# Patient Record
Sex: FEMALE | Race: WHITE | NOT HISPANIC OR LATINO | Employment: OTHER | URBAN - METROPOLITAN AREA
[De-identification: names, ages, dates, MRNs, and addresses within clinical notes are randomized per-mention and may not be internally consistent; named-entity substitution may affect disease eponyms.]

---

## 2017-04-26 ENCOUNTER — OFFICE VISIT (OUTPATIENT)
Dept: OPHTHALMOLOGY | Facility: CLINIC | Age: 77
End: 2017-04-26
Attending: OPHTHALMOLOGY
Payer: MEDICARE

## 2017-04-26 DIAGNOSIS — H02.88B MEIBOMIAN GLAND DYSFUNCTION (MGD), BILATERAL, BOTH UPPER AND LOWER LIDS: ICD-10-CM

## 2017-04-26 DIAGNOSIS — H04.123 DRY EYES, BILATERAL: ICD-10-CM

## 2017-04-26 DIAGNOSIS — H01.01B ULCERATIVE BLEPHARITIS OF UPPER AND LOWER EYELIDS OF BOTH EYES: Primary | ICD-10-CM

## 2017-04-26 DIAGNOSIS — H02.88A MEIBOMIAN GLAND DYSFUNCTION (MGD), BILATERAL, BOTH UPPER AND LOWER LIDS: ICD-10-CM

## 2017-04-26 DIAGNOSIS — H01.01A ULCERATIVE BLEPHARITIS OF UPPER AND LOWER EYELIDS OF BOTH EYES: Primary | ICD-10-CM

## 2017-04-26 PROCEDURE — 99213 OFFICE O/P EST LOW 20 MIN: CPT | Mod: ZF

## 2017-04-26 RX ORDER — DOXYCYCLINE HYCLATE 50 MG/1
TABLET, FILM COATED ORAL
COMMUNITY
Start: 2016-04-14 | End: 2017-09-18

## 2017-04-26 RX ORDER — LEVOTHYROXINE SODIUM 25 UG/1
TABLET ORAL
COMMUNITY
Start: 2000-01-01 | End: 2018-10-31

## 2017-04-26 RX ORDER — DOXYCYCLINE 100 MG/1
100 CAPSULE ORAL 2 TIMES DAILY
Qty: 120 CAPSULE | Refills: 0 | Status: SHIPPED | OUTPATIENT
Start: 2017-04-26 | End: 2017-07-17

## 2017-04-26 RX ORDER — ALBUTEROL SULFATE 90 UG/1
AEROSOL, METERED RESPIRATORY (INHALATION)
COMMUNITY
Start: 1990-01-01

## 2017-04-26 RX ORDER — CYCLOSPORINE 0.5 MG/ML
EMULSION OPHTHALMIC
COMMUNITY
Start: 2016-11-01 | End: 2017-09-18

## 2017-04-26 RX ORDER — LOSARTAN POTASSIUM 100 MG/1
TABLET ORAL
COMMUNITY
Start: 2000-01-01 | End: 2022-08-12

## 2017-04-26 RX ORDER — AMLODIPINE BESYLATE 5 MG/1
TABLET ORAL
COMMUNITY
Start: 2000-01-01

## 2017-04-26 ASSESSMENT — REFRACTION_WEARINGRX
OD_CYLINDER: +0.25
OD_AXIS: 168
OD_SPHERE: -1.00
OS_AXIS: 082
OS_CYLINDER: +1.00
OS_SPHERE: -0.75
OD_ADD: +2.50

## 2017-04-26 ASSESSMENT — CONF VISUAL FIELD
OD_NORMAL: 1
OS_NORMAL: 1
METHOD: COUNTING FINGERS

## 2017-04-26 ASSESSMENT — VISUAL ACUITY
METHOD: SNELLEN - LINEAR
OS_CC+: +2
CORRECTION_TYPE: GLASSES
OD_CC: J1+
OS_CC: J1+
OD_CC: 20/20
OS_CC: 20/25

## 2017-04-26 ASSESSMENT — TONOMETRY
IOP_METHOD: ICARE
OD_IOP_MMHG: 18
OS_IOP_MMHG: 17

## 2017-04-26 ASSESSMENT — EXTERNAL EXAM - RIGHT EYE: OD_EXAM: NORMAL

## 2017-04-26 ASSESSMENT — EXTERNAL EXAM - LEFT EYE: OS_EXAM: NORMAL

## 2017-04-26 NOTE — PATIENT INSTRUCTIONS
CONTINUE:  Artificial tears, preservative free, at least 4 x daily  Restasis 2 x daily  Warm compresses 2 x daily  Fish oil caps    INCREASE:  Doxycycline to 100mg 2 x per day for 1 month, then 100mg daily for 2 months    RESTART:  Lotemax drops 2-3 x daily    START:  Tobradex ointment at bedtime

## 2017-04-26 NOTE — MR AVS SNAPSHOT
After Visit Summary   4/26/2017    Maria A Ramirez    MRN: [id number]           Patient Information     Date Of Birth          1940        Visit Information        Provider Department      4/26/2017 8:15 AM [facility]per [facility]jas MD [facility]        Today's Diagnoses     Ulcerative blepharitis of upper and lower eyelids of both eyes    -  1    Meibomian gland dysfunction (MGD), bilateral, both upper and lower lids        Dry eyes, bilateral          Care Instructions    CONTINUE:  Artificial tears, preservative free, at least 4 x daily  Restasis 2 x daily  Warm compresses 2 x daily  Fish oil caps    INCREASE:  Doxycycline to 100mg 2 x per day for 1 month, then 100mg daily for 2 months    RESTART:  Lotemax drops 2-3 x daily    START:  Tobradex ointment at bedtime        Follow-ups after your visit        Follow-up notes from your care team     Return in about 4 weeks (around 5/24/2017) for ocular surface check and punctal plug placement.      Your next 10 appointments already scheduled     May 24, 2017  9:45 AM CDT   RETURN GENERAL with Piper Borjas MD   Eye Clinic ([facility])    [address]  [address]  [address]  [facility] [id number] [phone]              Who to contact     Please call your clinic at [phone] to:    Ask questions about your health    Make or cancel appointments    Discuss your medicines    Learn about your test results    Speak to your doctor   If you have compliments or concerns about an experience at your clinic, or if you wish to file a complaint, please contact [facility] Patient Relations at [phone] or email us at [email or web address]         Additional Information About Your Visit        MyChart Information     [employer] gives you secure access to your electronic health record. If you see a primary care provider, you can also send messages to your care team and make appointments. If you  have questions, please call your primary care clinic.  If you do not have a primary care provider, please call [phone] and they will assist you.      Be[employer] is an electronic gateway that provides easy, online access to your medical records. With [employer], you can request a clinic appointment, read your test results, renew a prescription or communicate with your care team.     To access your existing account, please contact your [facility] or call [phone] for assistance.        Care EveryWhere ID     This is your Care EveryWhere ID. This could be used by other organizations to access your [facility] medical records  [id number]         Blood Pressure from Last 3 Encounters:   No data found for BP    Weight from Last 3 Encounters:   No data found for Wt              Today, you had the following     No orders found for display         Today's Medication Changes          These changes are accurate as of: 4/26/17 10:05 AM.  If you have any questions, ask your nurse or doctor.               Start taking these medicines.        Dose/Directions    tobramycin-dexamethasone ophthalmic ointment   Commonly known as:  TOBRADEX   Used for:  Ulcerative blepharitis of upper and lower eyelids of both eyes, Meibomian gland dysfunction (MGD), bilateral, both upper and lower lids   Started by:  Piper Borjas MD        Dose:  1 Application   Place 1 Application into both eyes At Bedtime   Quantity:  1 Tube   Refills:  3         These medicines have changed or have updated prescriptions.        Dose/Directions    * Doxycycline Hyclate 50 MG Tabs   This may have changed:  Another medication with the same name was added. Make sure you understand how and when to take each.   Changed by:  Piper Borjas MD        50 mg 1 time daily   Refills:  0       * doxycycline 100 MG capsule   Commonly known as:  VIBRAMYCIN   This may have changed:  You were already taking a medication with the same name,  and this prescription was added. Make sure you understand how and when to take each.   Used for:  Ulcerative blepharitis of upper and lower eyelids of both eyes, Meibomian gland dysfunction (MGD), bilateral, both upper and lower lids   Changed by:  Piper Borjas MD        Dose:  100 mg   Take 1 capsule (100 mg) by mouth 2 times daily   Quantity:  120 capsule   Refills:  0       * Notice:  This list has 2 medication(s) that are the same as other medications prescribed for you. Read the directions carefully, and ask your doctor or other care provider to review them with you.         Where to get your medicines      These medications were sent to [id number] - [place], [place] - [address]  [address], [place] [address]    Hours:  test fax successful 4/5/04 kr Phone:  [phone]     doxycycline 100 MG capsule    tobramycin-dexamethasone ophthalmic ointment                Primary Care Provider    No Pcp Confirmed       No address on file        Thank you!     Thank you for choosing EYE CLINIC  for your care. Our goal is always to provide you with excellent care. Hearing back from our patients is one way we can continue to improve our services. Please take a few minutes to complete the written survey that you may receive in the mail after your visit with us. Thank you!             Your Updated Medication List - Protect others around you: Learn how to safely use, store and throw away your medicines at www.disposemymeds.org.          This list is accurate as of: 4/26/17 10:05 AM.  Always use your most recent med list.                   Brand Name Dispense Instructions for use    albuterol 108 (90 BASE) MCG/ACT Inhaler   Generic drug:  albuterol          amLODIPine 5 MG tablet    NORVASC     5 mg 1 time daily       * Doxycycline Hyclate 50 MG Tabs      50 mg 1 time daily       * doxycycline 100 MG capsule    VIBRAMYCIN    120 capsule    Take 1 capsule (100 mg) by mouth 2 times daily        levothyroxine 25 MCG tablet    SYNTHROID/LEVOTHROID     0.025 mg 1 time daily       losartan 100 MG tablet    COZAAR     100 mg 1 time daily       RESTASIS 0.05 % ophthalmic emulsion   Generic drug:  cycloSPORINE      2 times daily OU       tobramycin-dexamethasone ophthalmic ointment    TOBRADEX    1 Tube    Place 1 Application into both eyes At Bedtime       * Notice:  This list has 2 medication(s) that are the same as other medications prescribed for you. Read the directions carefully, and ask your doctor or other care provider to review them with you.

## 2017-04-26 NOTE — NURSING NOTE
Chief Complaints and History of Present Illnesses   Patient presents with     New Patient     Dry eyes     HPI    Affected eye(s):  Both   Symptoms:     Blurred vision   No decreased vision   Foreign body sensation   No tearing   Dryness   Burning   No photophobia   No eye discharge      Frequency:  Constant       Do you have eye pain now?:  No      Comments:  Pt. stated dry eyes over the last 2 years along with intermittent gritty feeling OU. Intermittent blurry vision.  AT's Gentael 3-4 times daily, Restasis 2 times daily. She tried Lotamax and Dexamethasone they made no difference.  Warm compresses daily.  Aleksandar Jim  8:45 AM April 26, 2017

## 2017-04-26 NOTE — PROGRESS NOTES
HPI  Maria A Ramirez is a 76 year old female here for another opinion on management of blepharitis. She has had bilateral eye redness, irritation, and steven feeling in both eyes for the last 2 year or so. The symptoms are worst when she in the car with the air blowing. She is currently using Restasis OU BID, Artificial tears preservative free at least 4 x daily, warm compresses about 2 x daily, fish oil, and doxycycline 50mg daily with minimal improvement. She has been on lotemax drops and ointment as well as dexamethasone ointment recently without much difference.    POH: Cataract surgery both eyes, s/p YAG OU, ERM  PMH: Asthma, thyroid disease, HTN  FH: No FH of AMD or glc  SH: Non-smoker    Assessment & Plan      (H01.011,  H01.012,  H01.015,  H01.014) Ulcerative blepharitis of upper and lower eyelids of both eyes  (primary encounter diagnosis)  (H02.89,  H01.002,  H01.004,  H01.005) Meibomian gland dysfunction (MGD), bilateral, both upper and lower lids  (H04.123) Dry eyes, bilateral  Comment: With persistent symptoms.   Plan: Recommend the following regimen  CONTINUE:  Artificial tears, preservative free, at least 4 x daily  Restasis 2 x daily  Warm compresses 2 x daily  Fish oil caps    INCREASE:  Doxycycline to 100mg 2 x per day for 1 month, then 100mg daily for 2 months    RESTART:  Lotemax drops 2-3 x daily    START:  Tobradex ointment at bedtime    Plan for recheck in 1 month with placement of punctal plugs next visit.      -----------------------------------------------------------------------------------    Patient disposition:   Return in about 4 weeks (around 5/24/2017) for ocular surface check and punctal plug placement. or sooner as needed.    Teaching statement:  Complete documentation of historical and exam elements from today's encounter can be found in the full encounter summary report (not reduplicated in this progress note). I personally obtained the chief complaint(s) and history of present  illness.  I confirmed and edited as necessary the review of systems, past medical/surgical history, family history, social history, and examination findings as documented by others; and I examined the patient myself. I personally reviewed the relevant tests, images, and reports as documented above.     I formulated and edited as necessary the assessment and plan and discussed the findings and management plan with the patient and family.    Piepr Borjas MD  Comprehensive Ophthalmology & Ocular Pathology  Department of Ophthalmology and Visual Neurosciences  [email or web address]  Pager [phone]

## 2017-05-16 ENCOUNTER — TELEPHONE (OUTPATIENT)
Dept: OPHTHALMOLOGY | Facility: CLINIC | Age: 77
End: 2017-05-16

## 2017-05-16 NOTE — TELEPHONE ENCOUNTER
----- Message from Kaitlynn Devine sent at 5/16/2017  7:31 AM CDT -----  Regarding: Eye Pain  Contact: [phone]  Pt of Dr. Borjas is requesting a call back. She stated that her eye pain seems to be getting worse and wonders if she should wait until her scheduled appt on 5/22 to come in or if she should come in sooner. Please call pt back to advise.     Thank you!    KI    Please DO NOT send this message and/or reply back to sender.  Call Center Representatives DO NOT respond to messages.

## 2017-05-16 NOTE — TELEPHONE ENCOUNTER
Last couple days worsening symptoms both eyes  New redness, irritation  Left eye pain last night-- improving  H/o ulcerative blepharits  Pt states restasis and lotemax eye drops hurt to place and stopped use  Pt using warm compresses, frequent preservative free artificial tears, doxycycline and tobradex ointment at night.  Scheduled appointment tomorrow, reviewed may stop restasis, lotemax if unable to tolerate, may use lubricating eye ointment at night if medicated ointment non-tolerable  Pt seemed pleased with plan   Hardy Keller RN 7:43 AM 05/16/17

## 2017-05-17 ENCOUNTER — OFFICE VISIT (OUTPATIENT)
Dept: OPHTHALMOLOGY | Facility: CLINIC | Age: 77
End: 2017-05-17
Attending: OPHTHALMOLOGY
Payer: MEDICARE

## 2017-05-17 DIAGNOSIS — H04.123 DRY EYES, BILATERAL: Primary | ICD-10-CM

## 2017-05-17 DIAGNOSIS — H02.88B MEIBOMIAN GLAND DYSFUNCTION (MGD), BILATERAL, BOTH UPPER AND LOWER LIDS: ICD-10-CM

## 2017-05-17 DIAGNOSIS — H02.88A MEIBOMIAN GLAND DYSFUNCTION (MGD), BILATERAL, BOTH UPPER AND LOWER LIDS: ICD-10-CM

## 2017-05-17 DIAGNOSIS — H01.01B ULCERATIVE BLEPHARITIS OF UPPER AND LOWER EYELIDS OF BOTH EYES: ICD-10-CM

## 2017-05-17 DIAGNOSIS — H01.01A ULCERATIVE BLEPHARITIS OF UPPER AND LOWER EYELIDS OF BOTH EYES: ICD-10-CM

## 2017-05-17 PROCEDURE — 99212 OFFICE O/P EST SF 10 MIN: CPT | Mod: ZF

## 2017-05-17 PROCEDURE — 68761 CLOSE TEAR DUCT OPENING: CPT | Mod: ZF | Performed by: OPHTHALMOLOGY

## 2017-05-17 ASSESSMENT — TONOMETRY
IOP_METHOD: ICARE
OD_IOP_MMHG: 19
OS_IOP_MMHG: 19

## 2017-05-17 ASSESSMENT — REFRACTION_WEARINGRX
OD_AXIS: 168
OD_ADD: +2.50
OD_CYLINDER: +0.25
OS_CYLINDER: +1.00
OS_AXIS: 082
OS_SPHERE: -0.75
OD_SPHERE: -1.00

## 2017-05-17 ASSESSMENT — VISUAL ACUITY
OS_CC+: -1
OD_CC: 20/25
OS_CC: 20/20
CORRECTION_TYPE: GLASSES
METHOD: SNELLEN - LINEAR

## 2017-05-17 ASSESSMENT — CONF VISUAL FIELD
OS_NORMAL: 1
OD_NORMAL: 1

## 2017-05-17 ASSESSMENT — EXTERNAL EXAM - RIGHT EYE: OD_EXAM: NORMAL

## 2017-05-17 ASSESSMENT — EXTERNAL EXAM - LEFT EYE: OS_EXAM: NORMAL

## 2017-05-17 NOTE — MR AVS SNAPSHOT
After Visit Summary   5/17/2017    Maria A Ramirez    MRN: [id number]           Patient Information     Date Of Birth          1940        Visit Information        Provider Department      5/17/2017 8:00 AM [facility]per [facility]Brjas MD [facility]        Today's Diagnoses     Dry eyes, bilateral    -  1    Meibomian gland dysfunction (MGD), bilateral, both upper and lower lids        Ulcerative blepharitis of upper and lower eyelids of both eyes           Follow-ups after your visit        Follow-up notes from your care team     Return in about 2 months (around 7/17/2017) for ocular surface check.      Who to contact     Please call your clinic at [phone] to:    Ask questions about your health    Make or cancel appointments    Discuss your medicines    Learn about your test results    Speak to your doctor   If you have compliments or concerns about an experience at your clinic, or if you wish to file a complaint, please contact [facility] Patient Relations at [phone] or email us at [email or web address]         Additional Information About Your Visit        MyChart Information     [employer] gives you secure access to your electronic health record. If you see a primary care provider, you can also send messages to your care team and make appointments. If you have questions, please call your primary care clinic.  If you do not have a primary care provider, please call [phone] and they will assist you.      The [employer] is an electronic gateway that provides easy, online access to your medical records. With [employer], you can request a clinic appointment, read your test results, renew a prescription or communicate with your care team.     To access your existing account, please contact your [facility] or call [phone] for assistance.        Care EveryWhere ID     This is your Care EveryWhere ID. This could be used by other  organizations to access your [place] medical records  [id number]         Blood Pressure from Last 3 Encounters:   No data found for BP    Weight from Last 3 Encounters:   No data found for Wt              We Performed the Following     Punctal Closure, Plugs          Today's Medication Changes          These changes are accurate as of: 5/17/17 10:12 AM.  If you have any questions, ask your nurse or doctor.               Start taking these medicines.        Dose/Directions    Lifitegrast 5 % Soln   Used for:  Dry eyes, bilateral   Started by:  Piper Borjas MD        Dose:  1 drop   Place 1 drop into both eyes 2 times daily   Quantity:  30 each   Refills:  11            Where to get your medicines      These medications were sent to [id number] - [email or web address], [place] - [address]  [address], [place] [address]    Hours:  test fax successful 4/5/04 kr Phone:  [phone]     Lifitegrast 5 % Soln                Primary Care Provider    No Pcp Confirmed       No address on file        Thank you!     Thank you for choosing EYE CLINIC  for your care. Our goal is always to provide you with excellent care. Hearing back from our patients is one way we can continue to improve our services. Please take a few minutes to complete the written survey that you may receive in the mail after your visit with us. Thank you!             Your Updated Medication List - Protect others around you: Learn how to safely use, store and throw away your medicines at www.disposemymeds.org.          This list is accurate as of: 5/17/17 10:12 AM.  Always use your most recent med list.                   Brand Name Dispense Instructions for use    albuterol 108 (90 BASE) MCG/ACT Inhaler   Generic drug:  albuterol          amLODIPine 5 MG tablet    NORVASC     5 mg 1 time daily       * Doxycycline Hyclate 50 MG Tabs      50 mg 1 time daily       * doxycycline 100 MG capsule    VIBRAMYCIN    120 capsule    Take 1  capsule (100 mg) by mouth 2 times daily       levothyroxine 25 MCG tablet    SYNTHROID/LEVOTHROID     0.025 mg 1 time daily       Lifitegrast 5 % Soln     30 each    Place 1 drop into both eyes 2 times daily       losartan 100 MG tablet    COZAAR     100 mg 1 time daily       RESTASIS 0.05 % ophthalmic emulsion   Generic drug:  cycloSPORINE      2 times daily OU       tobramycin-dexamethasone ophthalmic ointment    TOBRADEX    1 Tube    Place 1 Application into both eyes At Bedtime       * Notice:  This list has 2 medication(s) that are the same as other medications prescribed for you. Read the directions carefully, and ask your doctor or other care provider to review them with you.

## 2017-05-17 NOTE — PROGRESS NOTES
HPI  Maria A Ramirez is a 76 year old female here for followup of mgd and blepharitis. She reports that she stopped her restasis and lotemax a couple days ago because she had a stinging sensation in her eyes when she used them.     POH: Cataract surgery both eyes, s/p YAG OU, ERM  PMH: Asthma, thyroid disease, HTN  FH: No FH of AMD or glc  SH: Non-smoker    Meds  Doxy 100 mg daily BID  Restasis x 6 months, stopped yesterday  Lotemax x 6 months, stopped yesterday  Tobradex qHS x 3 weeks.    Assessment & Plan      (H01.011,  H01.012,  H01.015,  H01.014) Ulcerative blepharitis of upper and lower eyelids of both eyes  (primary encounter diagnosis)  (H02.89,  H01.002,  H01.004,  H01.005) Meibomian gland dysfunction (MGD), bilateral, both upper and lower lids  (H04.123) Dry eyes, bilateral  Comment: With persistent symptoms.   Plan: Recommend the following regimen  STOP:  Restasis  Lotemax    CONTINUE:  Artificial tears, preservative free, at least 4 x daily  Warm compresses 2 x daily  Fish oil caps  Doxycycline to 100mg 2 x per day for 1 month, then 100mg daily for 2 months  Tobradex ointment at bedtime    START:  Xiidra BID OU  Punctal plugs placed both eyes today (0.6 right LL, 0.7 left LL)      -----------------------------------------------------------------------------------    Patient disposition:   Return in about 2 months (around 7/17/2017) for ocular surface check. or sooner as needed.    Bienvenido Spain MD    Teaching statement:  Complete documentation of historical and exam elements from today's encounter can be found in the full encounter summary report (not reduplicated in this progress note). I personally obtained the chief complaint(s) and history of present illness.  I confirmed and edited as necessary the review of systems, past medical/surgical history, family history, social history, and examination findings as documented by others; and I examined the patient myself. I personally reviewed the relevant  tests, images, and reports as documented above.     I formulated and edited as necessary the assessment and plan and discussed the findings and management plan with the patient and family.    Piper Borjas MD  Comprehensive Ophthalmology & Ocular Pathology  Department of Ophthalmology and Visual Neurosciences  [email or web address]  Pager [phone]

## 2017-05-17 NOTE — NURSING NOTE
Chief Complaints and History of Present Illnesses   Patient presents with     Follow Up For     Ulcerative blepharitis of upper and lower eyelids of both eyes       HPI    Affected eye(s):  Both   Symptoms:        Duration:  3 weeks   Frequency:  Constant       Do you have eye pain now?:  No      Comments:  Pt. States that BE have been feeling more irritated and burning.   FB sensation BE.  No change in VA BE.  Naheed Fuentes COT 8:02 AM May 17, 2017

## 2017-05-23 ENCOUNTER — TELEPHONE (OUTPATIENT)
Dept: OPHTHALMOLOGY | Facility: CLINIC | Age: 77
End: 2017-05-23

## 2017-05-25 ENCOUNTER — TELEPHONE (OUTPATIENT)
Dept: OPHTHALMOLOGY | Facility: CLINIC | Age: 77
End: 2017-05-25

## 2017-05-25 NOTE — TELEPHONE ENCOUNTER
Pt states that her pharmacy told her they had sent three faxes to our office for the Xiidra which needs a PA - I informed pt that I see a note from the PA team from two days ago, so we should be hearing back soon (in the next week or so) whether the PA went through for the desired med.  I told pt to call us back if she hadn't heard anything this time next week.    iPper Jones COA 5:11 PM May 25, 2017

## 2017-06-02 NOTE — TELEPHONE ENCOUNTER
Left message with pt stating eye drop was approved and may contact pharmacy for pickup  Hardy Keller RN 2:11 PM 06/02/17

## 2017-06-02 NOTE — TELEPHONE ENCOUNTER
[facility] Prior Authorization Team   Phone: [phone]  Fax: [phone]      PA Initiation    Medication: Lifitegrast (XIIDRA) 5 % SOLN -   Insurance Company: [employer] - Phone [phone] Fax [phone]  Pharmacy Filling the Rx: [id number] - [place] - [address]  Filling Pharmacy Phone: [phone]  Filling Pharmacy Fax:    Start Date: 6/2/2017

## 2017-06-02 NOTE — TELEPHONE ENCOUNTER
Prior Authorization Approval    Authorization Effective Date: 3/4/2017  Authorization Expiration Date: 6/2/2018  Medication: Lifitegrast (XIIDRA) 5 % SOLN - approved  Approved Dose/Quantity:   Reference #: [id number]   Insurance Company: [employer] Pl[employer] Blue - Phone [phone] Fax [phone]  Expected CoPay: $85.70     CoPay Card Available:      Foundation Assistance Needed:    Which Pharmacy is filling the prescription (Not needed for infusion/clinic administered): [id number] - [place] - [address]  Pharmacy Notified: Yes  Patient Notified: Yes

## 2017-07-17 ENCOUNTER — OFFICE VISIT (OUTPATIENT)
Dept: OPHTHALMOLOGY | Facility: CLINIC | Age: 77
End: 2017-07-17
Attending: OPHTHALMOLOGY
Payer: MEDICARE

## 2017-07-17 DIAGNOSIS — H02.883 MEIBOMIAN GLAND DYSFUNCTION (MGD) OF BOTH EYES: Primary | ICD-10-CM

## 2017-07-17 DIAGNOSIS — H04.123 DRY EYES, BILATERAL: ICD-10-CM

## 2017-07-17 DIAGNOSIS — H02.88A MEIBOMIAN GLAND DYSFUNCTION (MGD), BILATERAL, BOTH UPPER AND LOWER LIDS: ICD-10-CM

## 2017-07-17 DIAGNOSIS — H02.88B MEIBOMIAN GLAND DYSFUNCTION (MGD), BILATERAL, BOTH UPPER AND LOWER LIDS: ICD-10-CM

## 2017-07-17 DIAGNOSIS — H01.01B ULCERATIVE BLEPHARITIS OF UPPER AND LOWER EYELIDS OF BOTH EYES: ICD-10-CM

## 2017-07-17 DIAGNOSIS — H01.01A ULCERATIVE BLEPHARITIS OF UPPER AND LOWER EYELIDS OF BOTH EYES: ICD-10-CM

## 2017-07-17 DIAGNOSIS — H02.886 MEIBOMIAN GLAND DYSFUNCTION (MGD) OF BOTH EYES: Primary | ICD-10-CM

## 2017-07-17 PROCEDURE — 68761 CLOSE TEAR DUCT OPENING: CPT | Mod: ZF,E3 | Performed by: OPHTHALMOLOGY

## 2017-07-17 PROCEDURE — 92015 DETERMINE REFRACTIVE STATE: CPT | Mod: ZF

## 2017-07-17 PROCEDURE — 68761 CLOSE TEAR DUCT OPENING: CPT | Mod: ZF,E1 | Performed by: OPHTHALMOLOGY

## 2017-07-17 PROCEDURE — 99213 OFFICE O/P EST LOW 20 MIN: CPT | Mod: ZF

## 2017-07-17 RX ORDER — ERYTHROMYCIN 5 MG/G
1 OINTMENT OPHTHALMIC AT BEDTIME
Qty: 3 TUBE | Refills: 4 | Status: SHIPPED | OUTPATIENT
Start: 2017-07-17 | End: 2018-01-10

## 2017-07-17 RX ORDER — DOXYCYCLINE HYCLATE 50 MG/1
50 CAPSULE ORAL DAILY
Qty: 90 CAPSULE | Refills: 4 | Status: SHIPPED | OUTPATIENT
Start: 2017-07-17 | End: 2018-01-10

## 2017-07-17 ASSESSMENT — VISUAL ACUITY
CORRECTION_TYPE: GLASSES
OS_CC+: -2
OS_CC: 20/20
OD_CC: 20/20 SLOW
METHOD: SNELLEN - LINEAR

## 2017-07-17 ASSESSMENT — TONOMETRY
IOP_METHOD: ICARE
OD_IOP_MMHG: 24
OS_IOP_MMHG: 25

## 2017-07-17 ASSESSMENT — REFRACTION_WEARINGRX
OD_AXIS: 168
OD_SPHERE: -1.00
OS_AXIS: 082
OS_SPHERE: -0.75
OD_ADD: +2.50
OD_CYLINDER: +0.25
OS_CYLINDER: +1.00

## 2017-07-17 ASSESSMENT — CONF VISUAL FIELD
METHOD: COUNTING FINGERS
OD_NORMAL: 1
OS_NORMAL: 1

## 2017-07-17 ASSESSMENT — REFRACTION_MANIFEST
OD_AXIS: 170
OS_CYLINDER: +1.00
OD_CYLINDER: +0.75
OS_AXIS: 080
OD_ADD: +2.75
OS_SPHERE: -0.50
OS_ADD: +2.75
OD_SPHERE: -1.25

## 2017-07-17 ASSESSMENT — EXTERNAL EXAM - LEFT EYE: OS_EXAM: NORMAL

## 2017-07-17 ASSESSMENT — EXTERNAL EXAM - RIGHT EYE: OD_EXAM: NORMAL

## 2017-07-17 NOTE — MR AVS SNAPSHOT
After Visit Summary   7/17/2017    Maria A Ramirez    MRN: [id number]           Patient Information     Date Of Birth          1940        Visit Information        Provider Department      7/17/2017 9:45 AM [facility]jas MD [facility]        Today's Diagnoses     Meibomian gland dysfunction (MGD) of both eyes    -  1    Ulcerative blepharitis of upper and lower eyelids of both eyes        Meibomian gland dysfunction (MGD), bilateral, both upper and lower lids           Follow-ups after your visit        Your next 10 appointments already scheduled     Sep 18, 2017  9:15 AM CDT   RETURN GENERAL with Piper Borjas MD   Eye Clinic ([facility])    [address]  [address] [facility]  [address]  [facility] [id number] [phone]              Who to contact     Please call your clinic at [phone] to:    Ask questions about your health    Make or cancel appointments    Discuss your medicines    Learn about your test results    Speak to your doctor   If you have compliments or concerns about an experience at your clinic, or if you wish to file a complaint, please contact [facility] Patient Relations at [phone] or email us at [email or web address]         Additional Information About Your Visit        MyChart Information     Alerts gives you secure access to your electronic health record. If you see a primary care provider, you can also send messages to your care team and make appointments. If you have questions, please call your primary care clinic.  If you do not have a primary care provider, please call [phone] and they will assist you.      Alerts is an electronic gateway that provides easy, online access to your medical records. With Alerts, you can request a clinic appointment, read your test results, renew a prescription or communicate with your care team.     To access your existing account, please contact your  [facility] or call [phone] for assistance.        Care EveryWhere ID     This is your Care EveryWhere ID. This could be used by other organizations to access your [place] medical records  [id number]         Blood Pressure from Last 3 Encounters:   No data found for BP    Weight from Last 3 Encounters:   No data found for Wt              Today, you had the following     No orders found for display         Today's Medication Changes          These changes are accurate as of: 7/17/17 11:08 AM.  If you have any questions, ask your nurse or doctor.               Start taking these medicines.        Dose/Directions    erythromycin ophthalmic ointment   Commonly known as:  ROMYCIN   Used for:  Meibomian gland dysfunction (MGD) of both eyes, Ulcerative blepharitis of upper and lower eyelids of both eyes   Started by:  Piper Borjas MD        Dose:  1 Application   Place 1 Application into both eyes At Bedtime   Quantity:  3 Tube   Refills:  4         These medicines have changed or have updated prescriptions.        Dose/Directions    * Doxycycline Hyclate 50 MG Tabs   This may have changed:  Another medication with the same name was changed. Make sure you understand how and when to take each.   Changed by:  Piper Borjas MD        50 mg 1 time daily   Refills:  0       * doxycycline 50 MG capsule   Commonly known as:  VIBRAMYCIN   This may have changed:    - medication strength  - how much to take  - when to take this   Used for:  Ulcerative blepharitis of upper and lower eyelids of both eyes, Meibomian gland dysfunction (MGD), bilateral, both upper and lower lids   Changed by:  Piper Borjas MD        Dose:  50 mg   Take 1 capsule (50 mg) by mouth daily   Quantity:  90 capsule   Refills:  4       * Notice:  This list has 2 medication(s) that are the same as other medications prescribed for you. Read the directions carefully, and ask your doctor or other care provider to  review them with you.         Where to get your medicines      These medications were sent to [facility] - [address] AT [address], Count includes the [facility] [address]     Phone:  [phone]     doxycycline 50 MG capsule    erythromycin ophthalmic ointment                Primary Care Provider    No Pcp Confirmed       No address on file        Equal Access to Services     ROXANE RAMIREZ : Hadii aad ku hadasho Soomaali, waaxda luqadaha, qaybta kaalmada adeegyada, waxay idiin hayaan adeeg seamussh laluis simms. So [facility] [phone].    ATENCIÓN: Si dinora kan, tiene a leung disposición servicios gratuitos de asistencia lingüística. [facility] [phone].    We comply with applicable federal civil rights laws and [place] laws. We do not discriminate on the basis of race, color, national origin, age, disability sex, sexual orientation or gender identity.            Thank you!     Thank you for choosing EYE CLINIC  for your care. Our goal is always to provide you with excellent care. Hearing back from our patients is one way we can continue to improve our services. Please take a few minutes to complete the written survey that you may receive in the mail after your visit with us. Thank you!             Your Updated Medication List - Protect others around you: Learn how to safely use, store and throw away your medicines at www.disposemymeds.org.          This list is accurate as of: 7/17/17 11:08 AM.  Always use your most recent med list.                   Brand Name Dispense Instructions for use Diagnosis    albuterol 108 (90 BASE) MCG/ACT Inhaler   Generic drug:  albuterol           amLODIPine 5 MG tablet    NORVASC     5 mg 1 time daily        * Doxycycline Hyclate 50 MG Tabs      50 mg 1 time daily        * doxycycline 50 MG capsule    VIBRAMYCIN    90 capsule    Take 1 capsule (50 mg) by mouth daily    Ulcerative blepharitis of upper and lower  eyelids of both eyes, Meibomian gland dysfunction (MGD), bilateral, both upper and lower lids       erythromycin ophthalmic ointment    ROMYCIN    3 Tube    Place 1 Application into both eyes At Bedtime    Meibomian gland dysfunction (MGD) of both eyes, Ulcerative blepharitis of upper and lower eyelids of both eyes       levothyroxine 25 MCG tablet    SYNTHROID/LEVOTHROID     0.025 mg 1 time daily        Lifitegrast 5 % Soln     30 each    Place 1 drop into both eyes 2 times daily    Dry eyes, bilateral       losartan 100 MG tablet    COZAAR     100 mg 1 time daily        RESTASIS 0.05 % ophthalmic emulsion   Generic drug:  cycloSPORINE      2 times daily OU        tobramycin-dexamethasone ophthalmic ointment    TOBRADEX    1 Tube    Place 1 Application into both eyes At Bedtime    Ulcerative blepharitis of upper and lower eyelids of both eyes, Meibomian gland dysfunction (MGD), bilateral, both upper and lower lids       * Notice:  This list has 2 medication(s) that are the same as other medications prescribed for you. Read the directions carefully, and ask your doctor or other care provider to review them with you.

## 2017-07-17 NOTE — PROGRESS NOTES
HPI  Maria A Ramirez is a 76 year old female here for followup of mgd and blepharitis. She feels her eyes are a little better since the punctal plugs. She stopped the Xiidra since it made her vision blurry for some time after instillation.    POH: Cataract surgery both eyes, s/p YAG OU, ERM  PMH: Asthma, thyroid disease, HTN  FH: No FH of AMD or glc  SH: Non-smoker    Assessment & Plan      (H01.011,  H01.012,  H01.015,  H01.014) Ulcerative blepharitis of upper and lower eyelids of both eyes  (primary encounter diagnosis)  (H02.89,  H01.002,  H01.004,  H01.005) Meibomian gland dysfunction (MGD), bilateral, both upper and lower lids  (H04.123) Dry eyes, bilateral  Comment: With improved but somewhat persistent symptoms. Has not tolerated Xiidra, Restasis, or Lotemax in the past.  Has punctal plugs both lower lids (0.6 right LL, 0.7 left LL)    Plan: Recommend the following regimen  STOP:  Xiidra  Tobradex    CONTINUE:  Artificial tears, preservative free, at least 4 x daily  Warm compresses 2 x daily  Fish oil caps  Doxycycline to 50mg daily    START:  Erythromycin ointment QHS  Punctal plugs placed both eyes today (0.5 right UL, 0.7 left UL)      -----------------------------------------------------------------------------------    Patient disposition:   Return in about 3 months (around 10/17/2017) for ocular surface check. or sooner as needed.    Teaching statement:  Complete documentation of historical and exam elements from today's encounter can be found in the full encounter summary report (not reduplicated in this progress note). I personally obtained the chief complaint(s) and history of present illness.  I confirmed and edited as necessary the review of systems, past medical/surgical history, family history, social history, and examination findings as documented by others; and I examined the patient myself. I personally reviewed the relevant tests, images, and reports as documented above.     I formulated and  edited as necessary the assessment and plan and discussed the findings and management plan with the patient and family.    Piper Borjas MD  Comprehensive Ophthalmology & Ocular Pathology  Department of Ophthalmology and Visual Neurosciences  [email or web address]  Pager [phone]

## 2017-07-17 NOTE — NURSING NOTE
Chief Complaints and History of Present Illnesses   Patient presents with     Follow Up For     Dry eyes     HPI    Affected eye(s):  Both   Symptoms:        Frequency:  Constant       Do you have eye pain now?:  No      Comments:  Feels that the va is better  Eyes always feel sore but has gotten better since plugs  Still has dryness  Little watery  Maggi Flower COT 9:40 AM July 17, 2017

## 2017-09-18 ENCOUNTER — OFFICE VISIT (OUTPATIENT)
Dept: OPHTHALMOLOGY | Facility: CLINIC | Age: 77
End: 2017-09-18
Attending: OPHTHALMOLOGY
Payer: MEDICARE

## 2017-09-18 DIAGNOSIS — H04.123 DRY EYES, BILATERAL: ICD-10-CM

## 2017-09-18 DIAGNOSIS — H01.02A SQUAMOUS BLEPHARITIS OF UPPER AND LOWER EYELIDS OF BOTH EYES: Primary | ICD-10-CM

## 2017-09-18 DIAGNOSIS — H01.02B SQUAMOUS BLEPHARITIS OF UPPER AND LOWER EYELIDS OF BOTH EYES: Primary | ICD-10-CM

## 2017-09-18 PROCEDURE — 99213 OFFICE O/P EST LOW 20 MIN: CPT | Mod: ZF

## 2017-09-18 RX ORDER — FLUOROMETHOLONE 0.1 %
1 SUSPENSION, DROPS(FINAL DOSAGE FORM)(ML) OPHTHALMIC (EYE) 3 TIMES DAILY
Qty: 1 BOTTLE | Refills: 3 | Status: SHIPPED | OUTPATIENT
Start: 2017-09-18 | End: 2018-03-14

## 2017-09-18 ASSESSMENT — TONOMETRY
OD_IOP_MMHG: 18
OS_IOP_MMHG: 16
IOP_METHOD: ICARE

## 2017-09-18 ASSESSMENT — REFRACTION_WEARINGRX
OD_AXIS: 168
OD_ADD: +2.50
OS_AXIS: 082
OD_SPHERE: -1.00
OS_SPHERE: -0.75
OS_CYLINDER: +1.00
OD_CYLINDER: +0.25

## 2017-09-18 ASSESSMENT — VISUAL ACUITY
CORRECTION_TYPE: GLASSES
METHOD: SNELLEN - LINEAR
OD_CC: 20/20-2
OS_CC: 20/20-1

## 2017-09-18 ASSESSMENT — EXTERNAL EXAM - RIGHT EYE: OD_EXAM: NORMAL

## 2017-09-18 ASSESSMENT — EXTERNAL EXAM - LEFT EYE: OS_EXAM: NORMAL

## 2017-09-18 ASSESSMENT — CONF VISUAL FIELD
OD_NORMAL: 1
METHOD: COUNTING FINGERS
OS_NORMAL: 1

## 2017-09-18 NOTE — PROGRESS NOTES
HPI  Maria A Ramirez is a 76 year old female here for followup of mgd and blepharitis. She has noticed worsening watering in both eyes. This doesn't happen all the time, but when it does, it is frustrating because it keeps her from reading. She also continues to have intermittent eye soreness.     POH: Cataract surgery both eyes, s/p YAG OU, ERM  PMH: Asthma, thyroid disease, HTN  FH: No FH of AMD or glc  SH: Non-smoker    Assessment & Plan      (H01.011,  H01.012,  H01.015,  H01.014) Ulcerative blepharitis of upper and lower eyelids of both eyes  (primary encounter diagnosis)  (H02.89,  H01.002,  H01.004,  H01.005) Meibomian gland dysfunction (MGD), bilateral, both upper and lower lids  (H04.123) Dry eyes, bilateral  Comment: With improved but persistent symptoms. Has not tolerated Xiidra, Restasis, or Lotemax in the past.  Has punctal plugs both lower lids (0.6 right LL, 0.7 left LL) and upper lids (0.5 right UL, 0.7 left UL)    Plan:   Remove upper punctal plugs due to epiphora  Recommend the following regimen    CONTINUE:  Artificial tears, preservative free, at least 4 x daily  Warm compresses 2 x daily  Fish oil caps  Doxycycline to 50mg daily  Erythromycin ointment QHS    START:  FML OU 3x daily    -----------------------------------------------------------------------------------    Patient disposition:   Return in about 2 months (around 11/18/2017) for ocular surface check. or sooner as needed.    Teaching statement:  Complete documentation of historical and exam elements from today's encounter can be found in the full encounter summary report (not reduplicated in this progress note). I personally obtained the chief complaint(s) and history of present illness.  I confirmed and edited as necessary the review of systems, past medical/surgical history, family history, social history, and examination findings as documented by others; and I examined the patient myself. I personally reviewed the relevant tests,  images, and reports as documented above.     I formulated and edited as necessary the assessment and plan and discussed the findings and management plan with the patient and family.    Piper Borjas MD  Comprehensive Ophthalmology & Ocular Pathology  Department of Ophthalmology and Visual Neurosciences  [email or web address]  Pager [phone]

## 2017-09-18 NOTE — NURSING NOTE
Chief Complaints and History of Present Illnesses   Patient presents with     Follow Up For     mgd and blepharitis     HPI    Affected eye(s):  Both   Symptoms:     Blurred vision   Difficulty with reading   Tearing   Dryness         Do you have eye pain now?:  No      Comments:  Using emycin roseline qhs. BE tearing and hard to read. BE feeling sore, about the same as last exam.   Cassie DASILVA September 18, 2017 9:14 AM

## 2017-09-18 NOTE — MR AVS SNAPSHOT
After Visit Summary   9/18/2017    Maria A Ramirez    MRN: [id number]           Patient Information     Date Of Birth          1940        Visit Information        Provider Department      9/18/2017 9:15 AM [facility]        Today's Diagnoses     Squamous blepharitis of upper and lower eyelids of both eyes    -  1    Dry eyes, bilateral           Follow-ups after your visit        Follow-up notes from your care team     Return in about 2 months (around 11/18/2017) for ocular surface check.      Who to contact     Please call your clinic at [phone] to:    Ask questions about your health    Make or cancel appointments    Discuss your medicines    Learn about your test results    Speak to your doctor   If you have compliments or concerns about an experience at your clinic, or if you wish to file a complaint, please contact [facility] Patient Relations at [phone] or email us at [email or web address]         Additional Information About Your Visit        MyChart Information     [employer] gives you secure access to your electronic health record. If you see a primary care provider, you can also send messages to your care team and make appointments. If you have questions, please call your primary care clinic.  If you do not have a primary care provider, please call [phone] and they will assist you.      [employer] is an electronic gateway that provides easy, online access to your medical records. With [employer], you can request a clinic appointment, read your test results, renew a prescription or communicate with your care team.     To access your existing account, please contact your [facility] or call [phone] for assistance.        Care EveryWhere ID     This is your Care EveryWhere ID. This could be used by other organizations to access your [place] medical records  [id number]         Blood  Pressure from Last 3 Encounters:   No data found for BP    Weight from Last 3 Encounters:   No data found for Wt              Today, you had the following     No orders found for display         Today's Medication Changes          These changes are accurate as of: 9/18/17  9:54 AM.  If you have any questions, ask your nurse or doctor.               Start taking these medicines.        Dose/Directions    fluorometholone 0.1 % ophthalmic susp   Commonly known as:  FML LIQUIFILM   Used for:  Squamous blepharitis of upper and lower eyelids of both eyes   Started by:  Piper Borjas MD        Dose:  1 drop   Place 1 drop into both eyes 3 times daily   Quantity:  1 Bottle   Refills:  3         Stop taking these medicines if you haven't already. Please contact your care team if you have questions.     Lifitegrast 5 % Soln   Stopped by:  Piper Borjas MD           tobramycin-dexamethasone ophthalmic ointment   Commonly known as:  TOBRADEX   Stopped by:  Piper Borjas MD                Where to get your medicines      These medications were sent to [facility] - [address] AT [address], [facility] [address]     Phone:  [phone]     fluorometholone 0.1 % ophthalmic susp                Primary Care Provider    No Pcp Confirmed       No address on file        Equal Access to Services     ROAXNE RAMIREZ AH: Dalia huertao Soray, waaxda luqadaha, qaybta kaalmada adeegyada, dante simms. So [facility] [phone].    ATENCIÓN: Si habla español, tiene a leung disposición servicios gratuitos de asistencia lingüística. Llame al [phone].    We comply with applicable federal civil rights laws and [place] laws. We do not discriminate on the basis of race, color, national origin, age, disability sex, sexual orientation or gender identity.            Thank you!     Thank you for choosing [facility]YE CLINIC  for  your care. Our goal is always to provide you with excellent care. Hearing back from our patients is one way we can continue to improve our services. Please take a few minutes to complete the written survey that you may receive in the mail after your visit with us. Thank you!             Your Updated Medication List - Protect others around you: Learn how to safely use, store and throw away your medicines at www.disposemymeds.org.          This list is accurate as of: 9/18/17  9:54 AM.  Always use your most recent med list.                   Brand Name Dispense Instructions for use Diagnosis    amLODIPine 5 MG tablet    NORVASC     5 mg 1 time daily        doxycycline 50 MG capsule    VIBRAMYCIN    90 capsule    Take 1 capsule (50 mg) by mouth daily    Ulcerative blepharitis of upper and lower eyelids of both eyes, Meibomian gland dysfunction (MGD), bilateral, both upper and lower lids       erythromycin ophthalmic ointment    ROMYCIN    3 Tube    Place 1 Application into both eyes At Bedtime    Meibomian gland dysfunction (MGD) of both eyes, Ulcerative blepharitis of upper and lower eyelids of both eyes       fluorometholone 0.1 % ophthalmic susp    FML LIQUIFILM    1 Bottle    Place 1 drop into both eyes 3 times daily    Squamous blepharitis of upper and lower eyelids of both eyes       levothyroxine 25 MCG tablet    SYNTHROID/LEVOTHROID     0.025 mg 1 time daily        losartan 100 MG tablet    COZAAR     100 mg 1 time daily        PROAIR  (90 BASE) MCG/ACT Inhaler   Generic drug:  albuterol

## 2017-11-20 ENCOUNTER — OFFICE VISIT (OUTPATIENT)
Dept: OPHTHALMOLOGY | Facility: CLINIC | Age: 77
End: 2017-11-20
Attending: OPHTHALMOLOGY
Payer: MEDICARE

## 2017-11-20 DIAGNOSIS — H04.123 DRY EYES, BILATERAL: ICD-10-CM

## 2017-11-20 DIAGNOSIS — H02.886 MEIBOMIAN GLAND DYSFUNCTION (MGD) OF BOTH EYES: ICD-10-CM

## 2017-11-20 DIAGNOSIS — H01.02A SQUAMOUS BLEPHARITIS OF UPPER AND LOWER EYELIDS OF BOTH EYES: Primary | ICD-10-CM

## 2017-11-20 DIAGNOSIS — H01.02B SQUAMOUS BLEPHARITIS OF UPPER AND LOWER EYELIDS OF BOTH EYES: Primary | ICD-10-CM

## 2017-11-20 DIAGNOSIS — H02.883 MEIBOMIAN GLAND DYSFUNCTION (MGD) OF BOTH EYES: ICD-10-CM

## 2017-11-20 PROCEDURE — 99212 OFFICE O/P EST SF 10 MIN: CPT | Mod: ZF

## 2017-11-20 ASSESSMENT — VISUAL ACUITY
METHOD: SNELLEN - LINEAR
CORRECTION_TYPE: GLASSES
OS_CC+: +2
OS_CC: 20/25
OD_CC: 20/25
OD_CC+: +2

## 2017-11-20 ASSESSMENT — TONOMETRY
OD_IOP_MMHG: 17
OS_IOP_MMHG: 25
OS_IOP_MMHG: 17
IOP_METHOD: APPLANATION
OD_IOP_MMHG: 17
IOP_METHOD: ICARE

## 2017-11-20 ASSESSMENT — REFRACTION_WEARINGRX
OD_CYLINDER: +0.25
OS_AXIS: 082
OD_AXIS: 168
OS_SPHERE: -0.75
OS_CYLINDER: +1.00
OD_ADD: +2.50
OD_SPHERE: -1.00

## 2017-11-20 ASSESSMENT — CONF VISUAL FIELD
OS_NORMAL: 1
OD_NORMAL: 1

## 2017-11-20 ASSESSMENT — EXTERNAL EXAM - LEFT EYE: OS_EXAM: NORMAL

## 2017-11-20 ASSESSMENT — EXTERNAL EXAM - RIGHT EYE: OD_EXAM: NORMAL

## 2017-11-20 NOTE — PROGRESS NOTES
HPI  Maria A Ramirez is a 77 year old female here for followup of mgd and blepharitis. She feels much better than last visit. She states she continues to have symptoms, but they are much more tolerable.    POH: Cataract surgery both eyes, s/p YAG OU, ERM  PMH: Asthma, thyroid disease, HTN  FH: No FH of AMD or glc  SH: Non-smoker    Assessment & Plan      (H01.011,  H01.012,  H01.015,  H01.014) Ulcerative blepharitis of upper and lower eyelids of both eyes  (primary encounter diagnosis)  (H02.89,  H01.002,  H01.004,  H01.005) Meibomian gland dysfunction (MGD), bilateral, both upper and lower lids  (H04.123) Dry eyes, bilateral  Comment: With improved symptoms. Has not tolerated Xiidra, Restasis, or Lotemax in the past.  Has punctal plugs both lower lids (0.6 right LL, 0.7 left LL)    CONTINUE:  Artificial tears, preservative free, at least 4 x daily  Warm compresses 2 x daily  Fish oil caps  Doxycycline to 50mg daily  FML OU 3x daily  Erythromycin ointment QHS PRN    -----------------------------------------------------------------------------------    Patient disposition:   Return in about 3 months (around 2/20/2018) for ocular surface check. or sooner as needed.    Teaching statement:  Complete documentation of historical and exam elements from today's encounter can be found in the full encounter summary report (not reduplicated in this progress note). I personally obtained the chief complaint(s) and history of present illness.  I confirmed and edited as necessary the review of systems, past medical/surgical history, family history, social history, and examination findings as documented by others; and I examined the patient myself. I personally reviewed the relevant tests, images, and reports as documented above.     I formulated and edited as necessary the assessment and plan and discussed the findings and management plan with the patient and family.    Piper Borjas MD  Comprehensive Ophthalmology & Ocular  Pathology  Department of Ophthalmology and Visual Neurosciences  [email or web address]  Pager [phone]

## 2017-11-20 NOTE — MR AVS SNAPSHOT
After Visit Summary   11/20/2017    Maria A Ramirez    MRN: [id number]           Patient Information     Date Of Birth          1940        Visit Information        Provider Department      11/20/2017 9:15 AM Piper Borjas MD [facility]         Follow-ups after your visit        Follow-up notes from your care team     Return in about 3 months (around 2/20/2018) for ocular surface check.      Your next 10 appointments already scheduled     Feb 21, 2018 10:00 AM CST   RETURN GENERAL with Piper Borjas MD   Eye Clinic ([facility])    [address]  [address] [address] [facility]  [address]  [facility] [address]   [phone]              Who to contact     Please call your clinic at [phone] to:    Ask questions about your health    Make or cancel appointments    Discuss your medicines    Learn about your test results    Speak to your doctor   If you have compliments or concerns about an experience at your clinic, or if you wish to file a complaint, please contact [facility] Patient Relations at [phone] or email us at [email or web address]         Additional Information About Your Visit        MyChart Information     [employer] gives you secure access to your electronic health record. If you see a primary care provider, you can also send messages to your care team and make appointments. If you have questions, please call your primary care clinic.  If you do not have a primary care provider, please call [phone] and they will assist you.      "[employer]hart is an electronic gateway that provides easy, online access to your medical records. With [employer], you can request a clinic appointment, read your test results, renew a prescription or communicate with your care team.     To access your existing account, please contact your [facility] or call [phone] for assistance.        Care EveryWhere ID     This is  your Care EveryWhere ID. This could be used by other organizations to access your [place] medical records  [id number]         Blood Pressure from Last 3 Encounters:   No data found for BP    Weight from Last 3 Encounters:   No data found for Wt              Today, you had the following     No orders found for display       Primary Care Provider Office Phone # Fax #    Manuela Bell [phone]       [facility] [address] [place] [address]        Equal Access to Services     AYDEN [facility] : Hadii aad ku hadasho Soomaali, waaxda luqadaha, qaybta kaalmada adeegyada, waxay idiin hayaan adeeg kharash la'aan . So [facility] [phone].    ATENCIÓN: Si habla español, tiene a leung disposición servicios gratuitos de asistencia lingüística. [facility] [phone].    We comply with applicable federal civil rights laws and [place] laws. We do not discriminate on the basis of race, color, national origin, age, disability, sex, sexual orientation, or gender identity.            Thank you!     Thank you for choosing EYE CLINIC  for your care. Our goal is always to provide you with excellent care. Hearing back from our patients is one way we can continue to improve our services. Please take a few minutes to complete the written survey that you may receive in the mail after your visit with us. Thank you!             Your Updated Medication List - Protect others around you: Learn how to safely use, store and throw away your medicines at www.disposemymeds.org.          This list is accurate as of: 11/20/17  9:56 AM.  Always use your most recent med list.                   Brand Name Dispense Instructions for use Diagnosis    amLODIPine 5 MG tablet    NORVASC     5 mg 1 time daily        doxycycline 50 MG capsule    VIBRAMYCIN    90 capsule    Take 1 capsule (50 mg) by mouth daily    Ulcerative blepharitis of upper and lower eyelids of both eyes, Meibomian gland dysfunction (MGD), bilateral, both  upper and lower lids       erythromycin ophthalmic ointment    ROMYCIN    3 Tube    Place 1 Application into both eyes At Bedtime    Meibomian gland dysfunction (MGD) of both eyes, Ulcerative blepharitis of upper and lower eyelids of both eyes       fluorometholone 0.1 % ophthalmic susp    FML LIQUIFILM    1 Bottle    Place 1 drop into both eyes 3 times daily    Squamous blepharitis of upper and lower eyelids of both eyes       levothyroxine 25 MCG tablet    SYNTHROID/LEVOTHROID     0.025 mg 1 time daily        losartan 100 MG tablet    COZAAR     100 mg 1 time daily        PROAIR  (90 BASE) MCG/ACT Inhaler   Generic drug:  albuterol

## 2017-11-20 NOTE — NURSING NOTE
Chief Complaints and History of Present Illnesses   Patient presents with     Follow Up For     s/p Squamous blepharitis of upper and lower eyelids of both eyes...     HPI    Affected eye(s):  Both   Symptoms:     No blurred vision   No decreased vision   No distorted vision   Dryness      Duration:  2 months   Frequency:  Constant       Do you have eye pain now?:  No      Comments:  Pt. stated she feels her dry eye has improved OU along with stable vision distance and near.  Aleksandar Jim  9:37 AM November 20, 2017

## 2018-01-10 DIAGNOSIS — H01.01B ULCERATIVE BLEPHARITIS OF UPPER AND LOWER EYELIDS OF BOTH EYES: ICD-10-CM

## 2018-01-10 DIAGNOSIS — H02.88A MEIBOMIAN GLAND DYSFUNCTION (MGD), BILATERAL, BOTH UPPER AND LOWER LIDS: ICD-10-CM

## 2018-01-10 DIAGNOSIS — H02.88B MEIBOMIAN GLAND DYSFUNCTION (MGD), BILATERAL, BOTH UPPER AND LOWER LIDS: ICD-10-CM

## 2018-01-10 DIAGNOSIS — H02.886 MEIBOMIAN GLAND DYSFUNCTION (MGD) OF BOTH EYES: ICD-10-CM

## 2018-01-10 DIAGNOSIS — H02.883 MEIBOMIAN GLAND DYSFUNCTION (MGD) OF BOTH EYES: ICD-10-CM

## 2018-01-10 DIAGNOSIS — H01.01A ULCERATIVE BLEPHARITIS OF UPPER AND LOWER EYELIDS OF BOTH EYES: ICD-10-CM

## 2018-01-10 RX ORDER — DOXYCYCLINE HYCLATE 50 MG/1
50 CAPSULE ORAL DAILY
Qty: 90 CAPSULE | Refills: 2 | Status: SHIPPED | OUTPATIENT
Start: 2018-01-10 | End: 2018-03-14

## 2018-01-10 RX ORDER — ERYTHROMYCIN 5 MG/G
OINTMENT OPHTHALMIC
Qty: 3 TUBE | Refills: 2 | Status: SHIPPED | OUTPATIENT
Start: 2018-01-10 | End: 2018-03-14

## 2018-01-10 NOTE — TELEPHONE ENCOUNTER
doxyclycline  Last Written Prescription Date:  7/17/17  Last Fill Quantity: 90,   # refills: 4  Erythromycin ointment      Last Written Prescription Date:  7/17/17  Last Fill Quantity: 3 tube,   # refills: 4  Last Office Visit: 11/20/17  Future Office visit:   2/21/18  Last Note:Progress Notes  Encounter Date: 11/20/2017  Piper Borjas MD   Ophthalmology      []Hide copied text  HPI  Maria A Ramirez is a 77 year old female here for followup of mgd and blepharitis. She feels much better than last visit. She states she continues to have symptoms, but they are much more tolerable.     POH: Cataract surgery both eyes, s/p YAG OU, ERM  PMH: Asthma, thyroid disease, HTN  FH: No FH of AMD or glc  SH: Non-smoker     Assessment & Plan   Assessment & Plan        (H01.011,  H01.012,  H01.015,  H01.014) Ulcerative blepharitis of upper and lower eyelids of both eyes  (primary encounter diagnosis)  (H02.89,  H01.002,  H01.004,  H01.005) Meibomian gland dysfunction (MGD), bilateral, both upper and lower lids  (H04.123) Dry eyes, bilateral  Comment: With improved symptoms. Has not tolerated Xiidra, Restasis, or Lotemax in the past.  Has punctal plugs both lower lids (0.6 right LL, 0.7 left LL)     CONTINUE:  Artificial tears, preservative free, at least 4 x daily  Warm compresses 2 x daily  Fish oil caps  Doxycycline to 50mg daily  FML OU 3x daily  Erythromycin ointment QHS PRN     -----------------------------------------------------------------------------------     Patient disposition:   Return in about 3 months (around 2/20/2018) for ocular surface check. or sooner as needed.     Teaching statement:  Complete documentation of historical and exam elements from today's encounter can be found in the full encounter summary report (not reduplicated in this progress note). I personally obtained the chief complaint(s) and history of present illness.  I confirmed and edited as necessary the review of systems, past medical/surgical  history, family history, social history, and examination findings as documented by others; and I examined the patient myself. I personally reviewed the relevant tests, images, and reports as documented above.      I formulated and edited as necessary the assessment and plan and discussed the findings and management plan with the patient and family.     Piper Borjas MD  Comprehensive Ophthalmology & Ocular Pathology  Department of Ophthalmology and Visual Neurosciences  [email or web address]  Pager [phone]         Routed to MD, new prescriptions to new pharmacy needed due to insurance change prior to next appt  Erythromycin directions updated to prn per last chart note

## 2018-03-14 ENCOUNTER — OFFICE VISIT (OUTPATIENT)
Dept: OPHTHALMOLOGY | Facility: CLINIC | Age: 78
End: 2018-03-14
Attending: OPHTHALMOLOGY
Payer: COMMERCIAL

## 2018-03-14 DIAGNOSIS — H02.88B MEIBOMIAN GLAND DYSFUNCTION (MGD), BILATERAL, BOTH UPPER AND LOWER LIDS: ICD-10-CM

## 2018-03-14 DIAGNOSIS — H01.01A ULCERATIVE BLEPHARITIS OF UPPER AND LOWER EYELIDS OF BOTH EYES: ICD-10-CM

## 2018-03-14 DIAGNOSIS — H02.88A MEIBOMIAN GLAND DYSFUNCTION (MGD), BILATERAL, BOTH UPPER AND LOWER LIDS: ICD-10-CM

## 2018-03-14 DIAGNOSIS — H01.01B ULCERATIVE BLEPHARITIS OF UPPER AND LOWER EYELIDS OF BOTH EYES: ICD-10-CM

## 2018-03-14 DIAGNOSIS — H01.02A SQUAMOUS BLEPHARITIS OF UPPER AND LOWER EYELIDS OF BOTH EYES: ICD-10-CM

## 2018-03-14 DIAGNOSIS — H02.886 MEIBOMIAN GLAND DYSFUNCTION (MGD) OF BOTH EYES: ICD-10-CM

## 2018-03-14 DIAGNOSIS — H02.883 MEIBOMIAN GLAND DYSFUNCTION (MGD) OF BOTH EYES: ICD-10-CM

## 2018-03-14 DIAGNOSIS — H01.02B SQUAMOUS BLEPHARITIS OF UPPER AND LOWER EYELIDS OF BOTH EYES: ICD-10-CM

## 2018-03-14 PROCEDURE — G0463 HOSPITAL OUTPT CLINIC VISIT: HCPCS | Mod: ZF

## 2018-03-14 RX ORDER — DOXYCYCLINE HYCLATE 50 MG/1
50 CAPSULE ORAL DAILY
Qty: 90 CAPSULE | Refills: 4 | Status: SHIPPED | OUTPATIENT
Start: 2018-03-14 | End: 2019-02-25

## 2018-03-14 RX ORDER — ERYTHROMYCIN 5 MG/G
OINTMENT OPHTHALMIC
Qty: 3 TUBE | Refills: 4 | Status: SHIPPED | OUTPATIENT
Start: 2018-03-14 | End: 2018-10-31

## 2018-03-14 RX ORDER — FLUOROMETHOLONE 0.1 %
1 SUSPENSION, DROPS(FINAL DOSAGE FORM)(ML) OPHTHALMIC (EYE) 3 TIMES DAILY
Qty: 1 BOTTLE | Refills: 3 | Status: SHIPPED | OUTPATIENT
Start: 2018-03-14 | End: 2018-10-31

## 2018-03-14 ASSESSMENT — REFRACTION_WEARINGRX
OS_AXIS: 082
OD_AXIS: 168
OS_ADD: +2.50
OS_CYLINDER: +1.00
OD_SPHERE: -1.00
OS_SPHERE: -0.75
OD_CYLINDER: +0.25
OD_ADD: +2.50

## 2018-03-14 ASSESSMENT — VISUAL ACUITY
OD_CC: 20/30
OS_CC: 20/20
CORRECTION_TYPE: GLASSES
OD_CC+: -2
METHOD: SNELLEN - LINEAR

## 2018-03-14 ASSESSMENT — TONOMETRY
OS_IOP_MMHG: 32
IOP_METHOD: APPLANATION
OS_IOP_MMHG: 13
OD_IOP_MMHG: 15
OD_IOP_MMHG: 14
IOP_METHOD: TONOPEN

## 2018-03-14 ASSESSMENT — EXTERNAL EXAM - LEFT EYE: OS_EXAM: NORMAL

## 2018-03-14 ASSESSMENT — CONF VISUAL FIELD
OS_NORMAL: 1
OD_NORMAL: 1

## 2018-03-14 ASSESSMENT — EXTERNAL EXAM - RIGHT EYE: OD_EXAM: NORMAL

## 2018-03-14 NOTE — MR AVS SNAPSHOT
After Visit Summary   3/14/2018    Maria A Ramirez    MRN: [id number]           Patient Information     Date Of Birth          1940        Visit Information        Provider Department      3/14/2018 2:30 PM Piper Borjas MD [facility]        Today's Diagnoses     Meibomian gland dysfunction (MGD) of both eyes        Ulcerative blepharitis of upper and lower eyelids of both eyes        Squamous blepharitis of upper and lower eyelids of both eyes        Meibomian gland dysfunction (MGD), bilateral, both upper and lower lids           Follow-ups after your visit        Follow-up notes from your care team     Return in about 3 months (around 6/14/2018) for ocular surface check and dilation.      Your next 10 appointments already scheduled     Jun 18, 2018  9:00 AM CDT   RETURN GENERAL with Piper Borjas MD   Eye Clinic ([facility])    [address]  [facility] [address] [phone]              Who to contact     Please call your clinic at [phone] to:    Ask questions about your health    Make or cancel appointments    Discuss your medicines    Learn about your test results    Speak to your doctor            Additional Information About Your Visit        MyChart Information     [employer] gives you secure access to your electronic health record. If you see a primary care provider, you can also send messages to your care team and make appointments. If you have questions, please call your primary care clinic.  If you do not have a primary care provider, please call [phone] and they will assist you.      [employer] is an electronic gateway that provides easy, online access to your medical records. With [employer], you can request a clinic appointment, read your test results, renew a prescription or communicate with your care team.     To access your existing account, please contact your [facility] or  call [phone] for assistance.        Care EveryWhere ID     This is your Care EveryWhere ID. This could be used by other organizations to access your [place] medical records  [id number]         Blood Pressure from Last 3 Encounters:   No data found for BP    Weight from Last 3 Encounters:   No data found for Wt              Today, you had the following     No orders found for display         Where to get your medicines      These medications were sent to [facility] - [address]  [address] [facility] [address]     Phone:  [phone]     doxycycline 50 MG capsule    erythromycin ophthalmic ointment    fluorometholone 0.1 % ophthalmic susp          Primary Care Provider Office Phone # Fax #    Manuela NOBLE Ray, LORENZO [phone]       [facility] [address] [place] [address]        Equal Access to Services     AYDEN RAMIREZ : Hadii aad ku hadasho Soomaali, waaxda luqadaha, qaybta kaalmada adeegyada, dante carcamo hayfelicitan anthony plascencia . So [facility] [phone].    ATENCIÓN: Si habla español, tiene a leung disposición servicios gratuitos de asistencia lingüística. [facility] al [phone].    We comply with applicable federal civil rights laws and [place] laws. We do not discriminate on the basis of race, color, national origin, age, disability, sex, sexual orientation, or gender identity.            Thank you!     Thank you for choosing EYE CLINIC  for your care. Our goal is always to provide you with excellent care. Hearing back from our patients is one way we can continue to improve our services. Please take a few minutes to complete the written survey that you may receive in the mail after your visit with us. Thank you!             Your Updated Medication List - Protect others around you: Learn how to safely use, store and throw away your medicines at www.disposemymeds.org.          This list is accurate as of 3/14/18  3:43 PM.   Always use your most recent med list.                   Brand Name Dispense Instructions for use Diagnosis    amLODIPine 5 MG tablet    NORVASC     5 mg 1 time daily        doxycycline 50 MG capsule    VIBRAMYCIN    90 capsule    Take 1 capsule (50 mg) by mouth daily    Ulcerative blepharitis of upper and lower eyelids of both eyes, Meibomian gland dysfunction (MGD), bilateral, both upper and lower lids       erythromycin ophthalmic ointment    ROMYCIN    3 Tube    Place 1 Application into both eyes At Bedtime prn    Meibomian gland dysfunction (MGD) of both eyes, Ulcerative blepharitis of upper and lower eyelids of both eyes       fluorometholone 0.1 % ophthalmic susp    FML LIQUIFILM    1 Bottle    Place 1 drop into both eyes 3 times daily    Squamous blepharitis of upper and lower eyelids of both eyes       levothyroxine 25 MCG tablet    SYNTHROID/LEVOTHROID     0.025 mg 1 time daily        losartan 100 MG tablet    COZAAR     100 mg 1 time daily        PROAIR  (90 BASE) MCG/ACT Inhaler   Generic drug:  albuterol

## 2018-03-14 NOTE — PROGRESS NOTES
HPI  Maria A Ramirez is a 77 year old female here for followup of mgd and blepharitis. She has gotten worse again over the last 2 months with increased burning and irritation.     POH: Cataract surgery both eyes, s/p YAG OU, ERM  PMH: Asthma, thyroid disease, HTN  FH: No FH of AMD or glc  SH: Non-smoker    Assessment & Plan      (H01.011,  H01.012,  H01.015,  H01.014) Ulcerative blepharitis of upper and lower eyelids of both eyes  (primary encounter diagnosis)  (H02.89,  H01.002,  H01.004,  H01.005) Meibomian gland dysfunction (MGD), bilateral, both upper and lower lids  (H04.123) Dry eyes, bilateral  Comment: With improved symptoms. Has not tolerated Xiidra, Restasis, or Lotemax in the past.  Has punctal plugs both lower lids (0.6 right LL, 0.7 left LL). Had upper plugs at one point, but there was too much watering and they were removed.    CONTINUE:  Artificial tears, preservative free, at least 4 x daily  Re-start Warm compresses 2 x daily  Fish oil caps  Doxycycline to 50mg daily  Re-start FML OU 3x daily  Erythromycin ointment QHS    -----------------------------------------------------------------------------------    Patient disposition:   Return in about 3 months (around 6/14/2018) for ocular surface check and dilation. or sooner as needed.    Teaching statement:  Complete documentation of historical and exam elements from today's encounter can be found in the full encounter summary report (not reduplicated in this progress note). I personally obtained the chief complaint(s) and history of present illness.  I confirmed and edited as necessary the review of systems, past medical/surgical history, family history, social history, and examination findings as documented by others; and I examined the patient myself. I personally reviewed the relevant tests, images, and reports as documented above.     I formulated and edited as necessary the assessment and plan and discussed the findings and management plan with  the patient and family.    Piper Borjas MD  Comprehensive Ophthalmology & Ocular Pathology  Department of Ophthalmology and Visual Neurosciences  [email or web address]  Pager [phone]

## 2018-03-14 NOTE — NURSING NOTE
Chief Complaints and History of Present Illnesses   Patient presents with     Follow Up For     Ulcerative Blepharitis      HPI    Affected eye(s):  Both   Symptoms:        Unknown duration       Do you have eye pain now?:  Yes   Location:  OU   Other:  between a 8 and changes with days and climate.    Pain Frequency:  Daily   Pain Characteristics:  Burning      Comments:  Pt complains of some tender and burning eyes which come and go often. Notes that she ran out of FML 1 month ago, otherwise using other eye meds with no problems. Vision is good and stable. DARRELL COATES, COA 2:52 PM 03/14/2018

## 2018-06-25 ENCOUNTER — OFFICE VISIT (OUTPATIENT)
Dept: OPHTHALMOLOGY | Facility: CLINIC | Age: 78
End: 2018-06-25
Attending: OPHTHALMOLOGY
Payer: COMMERCIAL

## 2018-06-25 DIAGNOSIS — H01.01A ULCERATIVE BLEPHARITIS OF UPPER AND LOWER EYELIDS OF BOTH EYES: ICD-10-CM

## 2018-06-25 DIAGNOSIS — H02.886 MEIBOMIAN GLAND DYSFUNCTION (MGD) OF BOTH EYES: ICD-10-CM

## 2018-06-25 DIAGNOSIS — H01.01B ULCERATIVE BLEPHARITIS OF UPPER AND LOWER EYELIDS OF BOTH EYES: ICD-10-CM

## 2018-06-25 DIAGNOSIS — H04.123 DRY EYES, BILATERAL: Primary | ICD-10-CM

## 2018-06-25 DIAGNOSIS — H02.883 MEIBOMIAN GLAND DYSFUNCTION (MGD) OF BOTH EYES: ICD-10-CM

## 2018-06-25 DIAGNOSIS — H43.813 PVD (POSTERIOR VITREOUS DETACHMENT), BILATERAL: ICD-10-CM

## 2018-06-25 PROCEDURE — G0463 HOSPITAL OUTPT CLINIC VISIT: HCPCS | Mod: ZF

## 2018-06-25 ASSESSMENT — REFRACTION_WEARINGRX
OD_ADD: +2.50
OD_SPHERE: -1.00
OS_SPHERE: -0.75
OS_ADD: +2.50
OS_AXIS: 082
OD_AXIS: 168
OS_CYLINDER: +1.00
OD_CYLINDER: +0.25

## 2018-06-25 ASSESSMENT — VISUAL ACUITY
OS_CC: 20/20
OD_CC+: -2
OS_CC+: -2
METHOD: SNELLEN - LINEAR
OD_CC: 20/30

## 2018-06-25 ASSESSMENT — TONOMETRY
IOP_METHOD: TONOPEN
OD_IOP_MMHG: 18
OS_IOP_MMHG: 19

## 2018-06-25 ASSESSMENT — CONF VISUAL FIELD
OD_NORMAL: 1
OS_NORMAL: 1
METHOD: COUNTING FINGERS

## 2018-06-25 ASSESSMENT — EXTERNAL EXAM - RIGHT EYE: OD_EXAM: NORMAL

## 2018-06-25 ASSESSMENT — EXTERNAL EXAM - LEFT EYE: OS_EXAM: NORMAL

## 2018-06-25 NOTE — MR AVS SNAPSHOT
After Visit Summary   6/25/2018    Maria A Ramirez    MRN: [id number]           Patient Information     Date Of Birth          1940        Visit Information        Provider Department      6/25/2018 8:45 AM Piper Borjas MD [facility]        Today's Diagnoses     Dry eyes, bilateral    -  1    Meibomian gland dysfunction (MGD) of both eyes        Ulcerative blepharitis of upper and lower eyelids of both eyes        PVD (posterior vitreous detachment), bilateral           Follow-ups after your visit        Follow-up notes from your care team     Return in about 4 months (around 10/25/2018) for f/u dry eye with IOP check, ocular surface check.      Your next 10 appointments already scheduled     Oct 22, 2018  8:45 AM CDT   RETURN GENERAL with Piper Borjas MD   Eye Clinic ([facility])    [address] [id number] [phone]              Who to contact     Please call your clinic at [phone] to:    Ask questions about your health    Make or cancel appointments    Discuss your medicines    Learn about your test results    Speak to your doctor            Additional Information About Your Visit        MyChart Information     [employer] gives you secure access to your electronic health record. If you see a primary care provider, you can also send messages to your care team and make appointments. If you have questions, please call your primary care clinic.  If you do not have a primary care provider, please call [phone] and they will assist you.      [employer] is an electronic gateway that provides easy, online access to your medical records. With [employer], you can request a clinic appointment, read your test results, renew a prescription or communicate with your care team.     To access your existing account, please contact your [facility] or call [phone] for assistance.        Care  EveryWhere ID     This is your Care EveryWhere ID. This could be used by other organizations to access your [place] medical records  [id number]         Blood Pressure from Last 3 Encounters:   No data found for BP    Weight from Last 3 Encounters:   No data found for Wt              Today, you had the following     No orders found for display       Primary Care Provider Office Phone # Fax #    Manuela Bell [phone]       [facility] [address] [place] [address]        Equal Access to Services     AYDEN [facility] : Hadii aad ku hadasho Soomaali, waaxda luqadaha, qaybta kaalmada adeegyada, waxay idiin hayaan adeeg kharash laluis . So [facility] [phone].    ATENCIÓN: Si habla español, tiene a leung disposición servicios gratuitos de asistencia lingüística. [facility] [phone].    We comply with applicable federal civil rights laws and [place] laws. We do not discriminate on the basis of race, color, national origin, age, disability, sex, sexual orientation, or gender identity.            Thank you!     Thank you for choosing EYE CLINIC  for your care. Our goal is always to provide you with excellent care. Hearing back from our patients is one way we can continue to improve our services. Please take a few minutes to complete the written survey that you may receive in the mail after your visit with us. Thank you!             Your Updated Medication List - Protect others around you: Learn how to safely use, store and throw away your medicines at www.disposemymeds.org.          This list is accurate as of 6/25/18 10:17 AM.  Always use your most recent med list.                   Brand Name Dispense Instructions for use Diagnosis    amLODIPine 5 MG tablet    NORVASC     5 mg 1 time daily        BREO ELLIPTA 100-25 MCG/INH oral inhaler   Generic drug:  fluticasone-vilanterol           doxycycline 50 MG capsule    VIBRAMYCIN    90 capsule    Take 1 capsule (50 mg) by mouth daily     Ulcerative blepharitis of upper and lower eyelids of both eyes, Meibomian gland dysfunction (MGD), bilateral, both upper and lower lids       erythromycin ophthalmic ointment    ROMYCIN    3 Tube    Place 1 Application into both eyes At Bedtime prn    Meibomian gland dysfunction (MGD) of both eyes, Ulcerative blepharitis of upper and lower eyelids of both eyes       fluorometholone 0.1 % ophthalmic susp    FML LIQUIFILM    1 Bottle    Place 1 drop into both eyes 3 times daily    Squamous blepharitis of upper and lower eyelids of both eyes       levothyroxine 25 MCG tablet    SYNTHROID/LEVOTHROID     0.025 mg 1 time daily        losartan 100 MG tablet    COZAAR     100 mg 1 time daily        PROAIR  (90 Base) MCG/ACT Inhaler   Generic drug:  albuterol

## 2018-06-25 NOTE — PROGRESS NOTES
HPI  Maria A Ramirez is a 77 year old female here for followup of mgd and blepharitis. She reports that both eyes continue to feel dry but may be mildly improved since last visit 3mo ago.     Current drops  erythro ointment at bedtime both eyes  FML 3x/day both eyes  Fish oil caps 1 capsule 1x/day  Doxycycline 50mg po daily  Warm compresses 3x/week    POH: Cataract surgery both eyes, s/p YAG OU, ERM  PMH: Asthma, thyroid disease, HTN  FH: No FH of AMD or glc  SH: Non-smoker    Assessment & Plan      (H04.123) Dry eyes, bilateral  (primary encounter diagnosis)  (H02.89) Meibomian gland dysfunction (MGD) of both eyes  (H01.011,  H01.012,  H01.015,  H01.014) Ulcerative blepharitis of upper and lower eyelids of both eyes  Comment: Symptoms improved since 3 months ago  Plan:  CONTINUE:  Increase frequency of Artificial tears, preservative free, to at least 4 x daily  Increase frequency of Warm compresses to 2 x daily  Fish oil caps  Doxycycline 50mg daily  FML OU 3x daily  Erythromycin ointment at bedtime    (H43.813) PVD (posterior vitreous detachment), bilateral  Comment: Asymptomatic with Mix ring both eyes   Plan: Retinal detachment/retinal tear precautions discussed with patient. Contact clinic immediately for new floaters/flashers or shadows in vision.      Lili Ramirez MD   Ophthalmology PGY-3  -----------------------------------------------------------------------------------    Patient disposition:   Return in about 4 months (around 10/25/2018) for f/u dry eye with IOP check, ocular surface check. or sooner as needed.      Teaching statement:  Complete documentation of historical and exam elements from today's encounter can be found in the full encounter summary report (not reduplicated in this progress note). I personally obtained the chief complaint(s) and history of present illness.  I confirmed and edited as necessary the review of systems, past medical/surgical history, family history, social  history, and examination findings as documented by others; and I examined the patient myself. I personally reviewed the relevant tests, images, and reports as documented above.     I formulated and edited as necessary the assessment and plan and discussed the findings and management plan with the patient and family.    Piper Borjas MD  Comprehensive Ophthalmology & Ocular Pathology  Department of Ophthalmology and Visual Neurosciences  [email or web address]  Pager [phone]

## 2018-06-25 NOTE — NURSING NOTE
Chief Complaints and History of Present Illnesses   Patient presents with     Follow Up For     3 month Ocular surface check with Dilation      HPI    Affected eye(s):  Both   Symptoms:           Do you have eye pain now?:  No      Comments:  Per pt vision has been ok x 3 months. BE still feeling dry since last visit 3 months ago and may be a bit better. Pt still using ointment at night a FML drops 3 times a day, and states they are helping with the dryness.     Janee Burk@ [facility] 8:32 AM June 25, 2018

## 2018-10-31 ENCOUNTER — OFFICE VISIT (OUTPATIENT)
Dept: OPHTHALMOLOGY | Facility: CLINIC | Age: 78
End: 2018-10-31
Attending: OPHTHALMOLOGY
Payer: COMMERCIAL

## 2018-10-31 DIAGNOSIS — H04.123 DRY EYES, BILATERAL: Primary | ICD-10-CM

## 2018-10-31 DIAGNOSIS — H01.02B SQUAMOUS BLEPHARITIS OF UPPER AND LOWER EYELIDS OF BOTH EYES: ICD-10-CM

## 2018-10-31 DIAGNOSIS — H01.01A ULCERATIVE BLEPHARITIS OF UPPER AND LOWER EYELIDS OF BOTH EYES: ICD-10-CM

## 2018-10-31 DIAGNOSIS — H02.883 MEIBOMIAN GLAND DYSFUNCTION (MGD) OF BOTH EYES: ICD-10-CM

## 2018-10-31 DIAGNOSIS — H01.01B ULCERATIVE BLEPHARITIS OF UPPER AND LOWER EYELIDS OF BOTH EYES: ICD-10-CM

## 2018-10-31 DIAGNOSIS — H02.886 MEIBOMIAN GLAND DYSFUNCTION (MGD) OF BOTH EYES: ICD-10-CM

## 2018-10-31 DIAGNOSIS — H01.02A SQUAMOUS BLEPHARITIS OF UPPER AND LOWER EYELIDS OF BOTH EYES: ICD-10-CM

## 2018-10-31 PROCEDURE — G0463 HOSPITAL OUTPT CLINIC VISIT: HCPCS | Mod: ZF

## 2018-10-31 RX ORDER — ERYTHROMYCIN 5 MG/G
OINTMENT OPHTHALMIC
Qty: 3 TUBE | Refills: 4 | Status: SHIPPED | OUTPATIENT
Start: 2018-10-31 | End: 2019-10-29

## 2018-10-31 ASSESSMENT — REFRACTION_WEARINGRX
OD_SPHERE: -1.00
OD_ADD: +2.50
OD_CYLINDER: +0.25
OS_SPHERE: -0.75
OD_AXIS: 168
OS_AXIS: 082
OS_CYLINDER: +1.00
OS_ADD: +2.50

## 2018-10-31 ASSESSMENT — EXTERNAL EXAM - RIGHT EYE: OD_EXAM: NORMAL

## 2018-10-31 ASSESSMENT — VISUAL ACUITY
OS_CC+: -1
METHOD: SNELLEN - LINEAR
OD_CC: 20/25
CORRECTION_TYPE: GLASSES
OS_CC: 20/20

## 2018-10-31 ASSESSMENT — CONF VISUAL FIELD
OD_NORMAL: 1
OS_NORMAL: 1

## 2018-10-31 ASSESSMENT — TONOMETRY
OS_IOP_MMHG: 17
IOP_METHOD: ICARE
OD_IOP_MMHG: 19

## 2018-10-31 ASSESSMENT — EXTERNAL EXAM - LEFT EYE: OS_EXAM: NORMAL

## 2018-10-31 NOTE — PROGRESS NOTES
HPI  Maria A Ramirez is a 78 year old female here for followup of mgd and blepharitis. She reports that both eyes continued to feel dry this past summer, but are better over the past month such that she has scaled back on her regimen. Using PFAT ~BID each eye. Not using FML or erythromycin ointment. Is doing WC ~1X per week. Still taking doxy and fish oil. S/p punctal plugs X 4 lids; uppers since removed d/t overflow. No improvement w/ Restasis. Did not tolerate Xiidra. Reports stable floaters; not seeing currently. No flashes.      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 Symptoms improved, but discussed increasing regimen as we head into the dry season.  Plan:  CONTINUE:  Artificial tears, preservative free, to at least 4 x daily  Resume Warm compresses to 2 x daily  Continue Fish oil caps  Continue Doxycycline 50mg daily  Erythromycin ointment at bedtime, refilled to resume for winter  Try wrap-around tinted glasses as recommended by Aliyah    Hold FML OU 3x daily for now    Patient disposition:   Return in about 4 months (around 2/28/2019) for ocular surface check. or sooner as needed.    Diaz Chaves MD  Ophthalmology Resident, PGY2    Teaching statement:  Complete documentation of historical and exam elements from today's encounter can be found in the full encounter summary report (not reduplicated in this progress note). I personally obtained the chief complaint(s) and history of present illness.  I confirmed and edited as necessary the review of systems, past medical/surgical history, family history, social history, and examination findings as documented by others; and I  examined the patient myself. I personally reviewed the relevant tests, images, and reports as documented above.     I formulated and edited as necessary the assessment and plan and discussed the findings and management plan with the patient and family.    Piper Borjas MD  Comprehensive Ophthalmology & Ocular Pathology  Department of Ophthalmology and Visual Neurosciences  [email or web address]  Pager [phone]

## 2018-10-31 NOTE — NURSING NOTE
Chief Complaints and History of Present Illnesses   Patient presents with     Follow Up For     dry eye  and IOP check     HPI    Affected eye(s):  Both   Symptoms:     No blurred vision   No decreased vision   No flashes      Duration:  4 months      Do you have eye pain now?:  No      Comments:  Follow up for dry eye and IOP check.   The patient notes that her eyes have been comfortable this past month.    BROWN Ashtno 1:56 PM 10/31/2018

## 2018-10-31 NOTE — MR AVS SNAPSHOT
After Visit Summary   10/31/2018    Maria A Ramirez    MRN: [id number]           Patient Information     Date Of Birth          1940        Visit Information        Provider Department      10/31/2018 1:45 PM [facility]        Today's Diagnoses     Dry eyes, bilateral    -  1    Meibomian gland dysfunction (MGD) of both eyes        Squamous blepharitis of upper and lower eyelids of both eyes        Ulcerative blepharitis of upper and lower eyelids of both eyes           Follow-ups after your visit        Follow-up notes from your care team     Return in about 4 months (around 2/28/2019) for ocular surface check.      Who to contact     Please call your clinic at [phone] to:    Ask questions about your health    Make or cancel appointments    Discuss your medicines    Learn about your test results    Speak to your doctor            Additional Information About Your Visit        MyChart Information     [employer] gives you secure access to your electronic health record. If you see a primary care provider, you can also send messages to your care team and make appointments. If you have questions, please call your primary care clinic.  If you do not have a primary care provider, please call [phone] and they will assist you.      [employer] is an electronic gateway that provides easy, online access to your medical records. With [employer], you can request a clinic appointment, read your test results, renew a prescription or communicate with your care team.     To access your existing account, please contact your [facility] or call [phone] for assistance.        Care EveryWhere ID     This is your Care EveryWhere ID. This could be used by other organizations to access your [facility] medical records  [id number]         Blood Pressure from Last 3 Encounters:   No data found for BP    Weight from Last 3 Encounters:   No data found for Wt               Today, you had the following     No orders found for display         Today's Medication Changes          These changes are accurate as of 10/31/18  5:37 PM.  If you have any questions, ask your nurse or doctor.               Stop taking these medicines if you haven't already. Please contact your care team if you have questions.     fluorometholone 0.1 % ophthalmic susp   Commonly known as:  FML LIQUIFILM   Stopped by:  Piper Borjas MD           levothyroxine 25 MCG tablet   Commonly known as:  SYNTHROID/LEVOTHROID   Stopped by:  Piper Borjas MD                Where to get your medicines      These medications were sent to [facility]  [place], [place] - [address]  [address] [address]     Phone:  [phone]     erythromycin ophthalmic ointment                Primary Care Provider Office Phone # Fax Elizabeth BellLORENZO [phone]       [facility] [address] [place] [address]        Equal Access to Services     [facility] CenterJENAE : Hadii aad ku hadasho Soomaali, waaxda luqadaha, qaybta kaalmada adeegyada, waxay idiin hayaan anthony plascencia . So [facility] [phone].    ATENCIÓN: Si habla español, tiene a leung disposición servicios gratuitos de asistencia lingüística. Shukri al [phone].    We comply with applicable federal civil rights laws and [place] laws. We do not discriminate on the basis of race, color, national origin, age, disability, sex, sexual orientation, or gender identity.            Thank you!     Thank you for choosing [facility]  for your care. Our goal is always to provide you with excellent care. Hearing back from our patients is one way we can continue to improve our services. Please take a few minutes to complete the written survey that you may receive in the mail after your visit with us. Thank you!             Your Updated Medication List - Protect others around you: Learn how to safely use,  store and throw away your medicines at www.disposemymeds.org.          This list is accurate as of 10/31/18  5:37 PM.  Always use your most recent med list.                   Brand Name Dispense Instructions for use Diagnosis    amLODIPine 5 MG tablet    NORVASC     5 mg 1 time daily        BREO ELLIPTA 100-25 MCG/INH inhaler   Generic drug:  fluticasone-vilanterol           doxycycline 50 MG capsule    VIBRAMYCIN    90 capsule    Take 1 capsule (50 mg) by mouth daily    Ulcerative blepharitis of upper and lower eyelids of both eyes, Meibomian gland dysfunction (MGD), bilateral, both upper and lower lids       erythromycin ophthalmic ointment    ROMYCIN    3 Tube    Place 1 Application into both eyes At Bedtime prn    Meibomian gland dysfunction (MGD) of both eyes, Ulcerative blepharitis of upper and lower eyelids of both eyes       losartan 100 MG tablet    COZAAR     100 mg 1 time daily        PROAIR  (90 Base) MCG/ACT inhaler   Generic drug:  albuterol

## 2019-02-25 ENCOUNTER — OFFICE VISIT (OUTPATIENT)
Dept: OPHTHALMOLOGY | Facility: CLINIC | Age: 79
End: 2019-02-25
Attending: OPHTHALMOLOGY
Payer: COMMERCIAL

## 2019-02-25 DIAGNOSIS — H01.01B ULCERATIVE BLEPHARITIS OF UPPER AND LOWER EYELIDS OF BOTH EYES: ICD-10-CM

## 2019-02-25 DIAGNOSIS — H02.88B MEIBOMIAN GLAND DYSFUNCTION (MGD), BILATERAL, BOTH UPPER AND LOWER LIDS: ICD-10-CM

## 2019-02-25 DIAGNOSIS — H01.01A ULCERATIVE BLEPHARITIS OF UPPER AND LOWER EYELIDS OF BOTH EYES: ICD-10-CM

## 2019-02-25 DIAGNOSIS — H02.88A MEIBOMIAN GLAND DYSFUNCTION (MGD), BILATERAL, BOTH UPPER AND LOWER LIDS: ICD-10-CM

## 2019-02-25 PROCEDURE — G0463 HOSPITAL OUTPT CLINIC VISIT: HCPCS | Mod: ZF | Performed by: TECHNICIAN/TECHNOLOGIST

## 2019-02-25 RX ORDER — DOXYCYCLINE HYCLATE 50 MG/1
50 CAPSULE ORAL DAILY
Qty: 90 CAPSULE | Refills: 4 | Status: SHIPPED | OUTPATIENT
Start: 2019-02-25 | End: 2019-06-26

## 2019-02-25 ASSESSMENT — VISUAL ACUITY
OD_CC+: -3
OS_CC: 20/25
CORRECTION_TYPE: GLASSES
OD_CC: 20/20
OS_CC+: -2
METHOD: SNELLEN - LINEAR

## 2019-02-25 ASSESSMENT — REFRACTION_WEARINGRX
OD_CYLINDER: +0.25
OS_SPHERE: -0.75
OS_AXIS: 082
OS_CYLINDER: +1.00
OS_ADD: +2.50
OD_ADD: +2.50
OD_SPHERE: -1.00
OD_AXIS: 168

## 2019-02-25 ASSESSMENT — TONOMETRY
OD_IOP_MMHG: 19
OS_IOP_MMHG: 19
IOP_METHOD: ICARE

## 2019-02-25 ASSESSMENT — CONF VISUAL FIELD
METHOD: COUNTING FINGERS
OD_NORMAL: 1
OS_NORMAL: 1

## 2019-02-25 ASSESSMENT — EXTERNAL EXAM - LEFT EYE: OS_EXAM: NORMAL

## 2019-02-25 ASSESSMENT — EXTERNAL EXAM - RIGHT EYE: OD_EXAM: NORMAL

## 2019-02-25 NOTE — PROGRESS NOTES
HPI  Maria A Ramirez is a 78 year old female here for followup of MGD and blepharitis. She reports that overall her eyes have been feeling comfortable, and the dryness is controlled. She continues her eye medication regimen and has been running a humidifier all the time.   Reports stable floaters; not seeing currently. No flashes.      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 Symptoms improved, but discussed increasing regimen as we head into the dry season.  Plan:  CONTINUE:  Artificial tears, preservative free, to at least 4 x daily  Warm compresses to 2 x daily  Continue Fish oil caps  Continue Doxycycline 50mg daily  Erythromycin ointment at bedtime, resume for winter  Try wrap-around tinted glasses as recommended by Aliyah Mendes FML OU 3x daily for now; no help from Restasis; Did not tolerate Xiidra.  S/p punctal plugs X 4 lids; uppers since removed d/t overflow - LL plugs in place    Patient disposition:   Return in about 4 months (around 6/25/2019) for ocular surface check and dilation. or sooner as needed.      Teaching statement:  Complete documentation of historical and exam elements from today's encounter can be found in the full encounter summary report (not reduplicated in this progress note). I personally obtained the chief complaint(s) and history of present illness.  I confirmed and edited as necessary the review of systems, past medical/surgical history, family history, social history, and examination findings as documented by others; and I examined the patient myself. I personally reviewed the relevant tests, images, and reports as documented above.     I  formulated and edited as necessary the assessment and plan and discussed the findings and management plan with the patient and family.    Piper Borjas MD  Comprehensive Ophthalmology & Ocular Pathology  Department of Ophthalmology and Visual Neurosciences  [email or web address]  Pager [phone]

## 2019-02-25 NOTE — NURSING NOTE
Chief Complaint(s) and History of Present Illness(es)     Follow Up     In both eyes (Ocular surface recheck).              Comments     Prednisone for the past 5 days for asthma - today is the last day per patient.   No new changes in vision each eye.   No double vision.     Janee Albert CO 2/25/2019 9:46 AM

## 2019-06-26 ENCOUNTER — OFFICE VISIT (OUTPATIENT)
Dept: OPHTHALMOLOGY | Facility: CLINIC | Age: 79
End: 2019-06-26
Attending: OPHTHALMOLOGY
Payer: COMMERCIAL

## 2019-06-26 DIAGNOSIS — M35.00 HISTORY OF SJOGREN'S DISEASE (H): Primary | ICD-10-CM

## 2019-06-26 DIAGNOSIS — H01.01B ULCERATIVE BLEPHARITIS OF UPPER AND LOWER EYELIDS OF BOTH EYES: ICD-10-CM

## 2019-06-26 DIAGNOSIS — H02.88B MEIBOMIAN GLAND DYSFUNCTION (MGD), BILATERAL, BOTH UPPER AND LOWER LIDS: ICD-10-CM

## 2019-06-26 DIAGNOSIS — H01.01A ULCERATIVE BLEPHARITIS OF UPPER AND LOWER EYELIDS OF BOTH EYES: ICD-10-CM

## 2019-06-26 DIAGNOSIS — H02.88A MEIBOMIAN GLAND DYSFUNCTION (MGD), BILATERAL, BOTH UPPER AND LOWER LIDS: ICD-10-CM

## 2019-06-26 PROCEDURE — G0463 HOSPITAL OUTPT CLINIC VISIT: HCPCS | Mod: ZF

## 2019-06-26 RX ORDER — DOXYCYCLINE HYCLATE 50 MG/1
50 CAPSULE ORAL DAILY
Qty: 90 CAPSULE | Refills: 4 | Status: SHIPPED | OUTPATIENT
Start: 2019-06-26 | End: 2020-09-28

## 2019-06-26 RX ORDER — ONDANSETRON 4 MG/1
TABLET, FILM COATED ORAL
Refills: 0 | COMMUNITY
Start: 2019-05-21 | End: 2019-10-29

## 2019-06-26 RX ORDER — CEVIMELINE HYDROCHLORIDE 30 MG/1
30 CAPSULE ORAL
COMMUNITY
Start: 2019-05-22 | End: 2022-08-12

## 2019-06-26 RX ORDER — DIPHENOXYLATE HYDROCHLORIDE AND ATROPINE SULFATE 2.5; .025 MG/1; MG/1
TABLET ORAL
COMMUNITY
Start: 2006-03-23

## 2019-06-26 RX ORDER — POLYETHYLENE GLYCOL 3350, SODIUM SULFATE, SODIUM CHLORIDE, POTASSIUM CHLORIDE, ASCORBIC ACID, SODIUM ASCORBATE 7.5-2.691G
KIT ORAL
Refills: 0 | COMMUNITY
Start: 2019-05-20 | End: 2019-10-29

## 2019-06-26 RX ORDER — DOXYCYCLINE 50 MG/1
TABLET ORAL
COMMUNITY
Start: 2017-09-19 | End: 2019-10-29

## 2019-06-26 RX ORDER — FLUOROMETHOLONE 0.1 %
1 SUSPENSION, DROPS(FINAL DOSAGE FORM)(ML) OPHTHALMIC (EYE) 3 TIMES DAILY
Qty: 10 ML | Refills: 1 | Status: SHIPPED | OUTPATIENT
Start: 2019-06-26 | End: 2019-09-30

## 2019-06-26 RX ORDER — POLYETHYLENE GLYCOL-3350, SODIUM CHLORIDE, POTASSIUM CHLORIDE AND SODIUM BICARBONATE 420; 11.2; 5.72; 1.48 G/438.4G; G/438.4G; G/438.4G; G/438.4G
POWDER, FOR SOLUTION ORAL
Refills: 0 | COMMUNITY
Start: 2019-05-16 | End: 2019-10-29

## 2019-06-26 RX ORDER — FLUOROMETHOLONE 0.1 %
1 SUSPENSION, DROPS(FINAL DOSAGE FORM)(ML) OPHTHALMIC (EYE)
COMMUNITY
Start: 2018-05-22 | End: 2019-06-26

## 2019-06-26 RX ORDER — HYDROCODONE BITARTRATE AND ACETAMINOPHEN 5; 325 MG/1; MG/1
TABLET ORAL
Refills: 0 | COMMUNITY
Start: 2019-04-08 | End: 2019-10-29

## 2019-06-26 ASSESSMENT — EXTERNAL EXAM - LEFT EYE: OS_EXAM: NORMAL

## 2019-06-26 ASSESSMENT — REFRACTION_WEARINGRX
OD_SPHERE: -1.00
OD_ADD: +2.50
OD_AXIS: 168
OS_ADD: +2.50
OS_AXIS: 082
OS_SPHERE: -0.75
OD_CYLINDER: +0.25
OS_CYLINDER: +1.00

## 2019-06-26 ASSESSMENT — CONF VISUAL FIELD
OS_NORMAL: 1
OD_NORMAL: 1

## 2019-06-26 ASSESSMENT — VISUAL ACUITY
METHOD: SNELLEN - LINEAR
OD_CC: 20/20
OS_CC+: -1
CORRECTION_TYPE: GLASSES
OS_CC: 20/20

## 2019-06-26 ASSESSMENT — TONOMETRY
IOP_METHOD: ICARE
OS_IOP_MMHG: 17
OD_IOP_MMHG: 19

## 2019-06-26 ASSESSMENT — EXTERNAL EXAM - RIGHT EYE: OD_EXAM: NORMAL

## 2019-06-26 NOTE — PROGRESS NOTES
HPI  Maria A Ramirez is a 78 year old female here for follow-up of ocular surface. States she was recently diagnosed with Sjogrens after a lip biopsy. States eye dryness unchanged. Restarted FML 2-3 times per day. Ran out of PO doxycycline and would like a refill. Stopped using Erythromycin ointment at bedtime - did not like oily feeling. Has not been using hot compresses regularly. Using AT's 2-3 times per day.  Reports stable floaters; not seeing currently. No flashes.    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 Lower plugs in place. Symptoms stable at this time    Plan:  CONTINUE:  Artificial tears, preservative free, to at least 4 x daily  Restart warm compresses 2 x daily  Continue Fish oil caps  Continue Doxycycline 50mg daily  FML TID each eye for now  Try wrap-around tinted glasses as recommended by Aliyah    Previously with no benefit from Restasis; Did not tolerate Xiidra (due to blurring for 20 minutes after administration)  S/p punctal plugs X 4 lids; uppers since removed d/t overflow - LL plugs in place    Discussed scleral CL as an option for dry eyes but patient states things are stable at this time but may want to consider them during the winter months- can consider referral for this at follow-up    Patient disposition:   Return in about 3 months (around 9/26/2019) for ocular surface check. or sooner as needed.    Lee Ann MD  Ophthalmology Resident, PGY-2    Teaching statement:  Complete documentation of historical and exam elements from today's encounter can be found in the full encounter summary report (not reduplicated  in this progress note). I personally obtained the chief complaint(s) and history of present illness.  I confirmed and edited as necessary the review of systems, past medical/surgical history, family history, social history, and examination findings as documented by others; and I examined the patient myself. I personally reviewed the relevant tests, images, and reports as documented above.     I formulated and edited as necessary the assessment and plan and discussed the findings and management plan with the patient and family.    Piper Borjas MD  Comprehensive Ophthalmology & Ocular Pathology  Department of Ophthalmology and Visual Neurosciences  [email or web address]  Pager [phone]

## 2019-06-26 NOTE — NURSING NOTE
Chief Complaints and History of Present Illnesses   Patient presents with     Blepharitis Follow-Up     Chief Complaint(s) and History of Present Illness(es)     Blepharitis Follow-Up     Laterality: both eyes    Duration: 4 months              Comments     Pt. States that she was recently diagnosed with sjogrens.   Hasn't noticed any improvement in eye symptoms. Still dry and irritated.  Naheed PAINTER 10:20 AM June 26, 2019

## 2019-09-28 ENCOUNTER — HEALTH MAINTENANCE LETTER (OUTPATIENT)
Age: 79
End: 2019-09-28

## 2019-09-30 ENCOUNTER — OFFICE VISIT (OUTPATIENT)
Dept: OPHTHALMOLOGY | Facility: CLINIC | Age: 79
End: 2019-09-30
Attending: OPHTHALMOLOGY
Payer: COMMERCIAL

## 2019-09-30 DIAGNOSIS — H02.88A MEIBOMIAN GLAND DYSFUNCTION (MGD), BILATERAL, BOTH UPPER AND LOWER LIDS: ICD-10-CM

## 2019-09-30 DIAGNOSIS — H01.01A ULCERATIVE BLEPHARITIS OF UPPER AND LOWER EYELIDS OF BOTH EYES: ICD-10-CM

## 2019-09-30 DIAGNOSIS — H02.88B MEIBOMIAN GLAND DYSFUNCTION (MGD), BILATERAL, BOTH UPPER AND LOWER LIDS: ICD-10-CM

## 2019-09-30 DIAGNOSIS — M35.00 HISTORY OF SJOGREN'S DISEASE (H): ICD-10-CM

## 2019-09-30 DIAGNOSIS — H01.01B ULCERATIVE BLEPHARITIS OF UPPER AND LOWER EYELIDS OF BOTH EYES: ICD-10-CM

## 2019-09-30 DIAGNOSIS — H04.123 DRY EYE SYNDROME, BILATERAL: Primary | ICD-10-CM

## 2019-09-30 PROCEDURE — 68761 CLOSE TEAR DUCT OPENING: CPT | Mod: ZF,LT | Performed by: OPHTHALMOLOGY

## 2019-09-30 PROCEDURE — G0463 HOSPITAL OUTPT CLINIC VISIT: HCPCS | Mod: ZF,25

## 2019-09-30 RX ORDER — FLUOROMETHOLONE 0.1 %
1 SUSPENSION, DROPS(FINAL DOSAGE FORM)(ML) OPHTHALMIC (EYE) 3 TIMES DAILY
Qty: 10 ML | Refills: 2 | Status: SHIPPED | OUTPATIENT
Start: 2019-09-30 | End: 2020-09-28

## 2019-09-30 ASSESSMENT — REFRACTION_WEARINGRX
OD_ADD: +2.50
OD_AXIS: 168
OS_AXIS: 082
OS_SPHERE: -0.75
OS_CYLINDER: +1.00
OD_CYLINDER: +0.25
OS_ADD: +2.50
OD_SPHERE: -1.00

## 2019-09-30 ASSESSMENT — VISUAL ACUITY
METHOD: SNELLEN - LINEAR
CORRECTION_TYPE: GLASSES
OD_CC: 20/20
OS_CC: 20/25

## 2019-09-30 ASSESSMENT — CONF VISUAL FIELD
OS_NORMAL: 1
OD_NORMAL: 1

## 2019-09-30 ASSESSMENT — EXTERNAL EXAM - RIGHT EYE: OD_EXAM: NORMAL

## 2019-09-30 ASSESSMENT — EXTERNAL EXAM - LEFT EYE: OS_EXAM: NORMAL

## 2019-09-30 ASSESSMENT — TONOMETRY
OD_IOP_MMHG: 14
OS_IOP_MMHG: 14
IOP_METHOD: ICARE

## 2019-09-30 NOTE — PROGRESS NOTES
HPI  Maria A Ramirez is a 78 year old female here for follow-up of dry eye and ocular surface.  States eye dryness is worse today in both eyes. She is using FML 3 times per day and PO doxycycline in addition to her tears, but is having progressed symptoms. Reports stable floaters; not seeing currently. No flashes.       Stopped using Erythromycin ointment at bedtime - did not like oily feeling. Has not been using hot compresses regularly. Using AT's 2-3 times per day.    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 Lower plugs in place on the right, fallen out on the left. Symptoms worse today.     Plan:  CONTINUE:  Artificial tears, preservative free, to at least 4 x daily  Restart warm compresses 2 x daily  Continue Fish oil caps  Continue Doxycycline 50mg daily  FML TID each eye for now  Try wrap-around tinted glasses as recommended by Aliyah    Discussed r/b/a of punctal plug replacement and she would like to proceed. (0.8 mm plug placed LLL)    Previously with no benefit from Restasis; Did not tolerate Xiidra (due to blurring for 20 minutes after administration), Did not like oily feeling of ointment.    S/p punctal plugs X 4 lids; uppers since removed d/t overflow    Discussed scleral CL vs ASEDs as an option for dry eyes. Will try SCL first.       Patient disposition:  Return for contact lens clinic next available, ocular surface check with me in 4 months. or sooner as needed.      Teaching statement:  Complete documentation of historical and exam elements from today's encounter can be found in the full encounter summary report  (not reduplicated in this progress note). I personally obtained the chief complaint(s) and history of present illness.  I confirmed and edited as necessary the review of systems, past medical/surgical history, family history, social history, and examination findings as documented by others; and I examined the patient myself. I personally reviewed the relevant tests, images, and reports as documented above.     I formulated and edited as necessary the assessment and plan and discussed the findings and management plan with the patient and family.    Piper Borjas MD  Comprehensive Ophthalmology & Ocular Pathology  Department of Ophthalmology and Visual Neurosciences  [email or web address]  Pager [phone]

## 2019-09-30 NOTE — NURSING NOTE
Chief Complaints and History of Present Illnesses   Patient presents with     Blepharitis Follow-Up     Chief Complaint(s) and History of Present Illness(es)     Blepharitis Follow-Up     Laterality: both eyes    Associated signs and symptoms: Negative for itching of lids, tearing, redness of lids and discharge    Course: gradually worsening    Treatments tried: warm compresses, lid scrubs and eye drops    Response to treatment: no improvement              Comments     follow-up of ocular surface  BE feel sore, getting worse  Think left plug came out  Warm compress 3X per week  Lid scrubs 2X week   FML tid BE  Systane bid BE  Nga Peter COA 9:32 AM September 30, 2019

## 2019-10-29 ENCOUNTER — OFFICE VISIT (OUTPATIENT)
Dept: OPTOMETRY | Facility: CLINIC | Age: 79
End: 2019-10-29
Payer: COMMERCIAL

## 2019-10-29 DIAGNOSIS — M35.01 SJOGREN'S SYNDROME WITH KERATOCONJUNCTIVITIS SICCA (H): ICD-10-CM

## 2019-10-29 DIAGNOSIS — H04.123 DRY EYES: Primary | ICD-10-CM

## 2019-10-29 ASSESSMENT — REFRACTION_WEARINGRX
OD_ADD: +2.50
OD_CYLINDER: +0.25
OS_AXIS: 082
OS_ADD: +2.50
OD_AXIS: 168
OS_SPHERE: -0.75
OS_CYLINDER: +1.00
OD_SPHERE: -1.00

## 2019-10-29 ASSESSMENT — REFRACTION_CURRENTRX
OS_BASECURVE: 7.7
OD_DIAMETER: 14.9
OS_SPHERE: -2.50
OS_BRAND: ONEFIT 2.0
OD_BRAND: ONEFIT 2.0
OD_BASECURVE: 7.6
OS_DIAMETER: 14.9
OD_SPHERE: -3.00

## 2019-10-29 ASSESSMENT — VISUAL ACUITY
OD_CC: 20/20-2
CORRECTION_TYPE: GLASSES
METHOD: SNELLEN - LINEAR
OS_CC: 20/25-2

## 2019-10-29 ASSESSMENT — EXTERNAL EXAM - LEFT EYE: OS_EXAM: NORMAL

## 2019-10-29 ASSESSMENT — EXTERNAL EXAM - RIGHT EYE: OD_EXAM: NORMAL

## 2019-10-29 NOTE — PROGRESS NOTES
A/P  1.) Dry Eye 2' Sjogren's Syndrome  -Dry despite topical treatment  -Good initial response to scleral lens trial today, does notice some dryness relief  -Reviewed findings with pt, she would like to proceed  -Aim for DVO with readers over    Order lenses, RTC 2 weeks for lens dispense/I&R    Contact Lens Billing  V-Code:  - Scleral cover shell  Final Contact Lens Rx       Brand Base Curve Diameter Sphere Lens Addl. Specs    Right Onefit 2.0 7.8 14.9 -2.37 std/2 flat edge Opt Extra clear    Left Onefit 2.0 7.8 14.9 -1.50 std/2 flat edge Opt Extra blue         # of units: 2  Price per Unit: $210    This patient requires contact lenses that are medically necessary for either improvement in vision over spectacles, support of the ocular surface, or other therapeutic benefit. These are not cosmetic contact lenses.     Encounter Diagnoses   Name Primary?     Dry eyes Yes     Sjogren's syndrome with keratoconjunctivitis sicca (H)

## 2019-11-13 ENCOUNTER — OFFICE VISIT (OUTPATIENT)
Dept: OPTOMETRY | Facility: CLINIC | Age: 79
End: 2019-11-13
Payer: COMMERCIAL

## 2019-11-13 DIAGNOSIS — H04.123 DRY EYES: Primary | ICD-10-CM

## 2019-11-13 DIAGNOSIS — M35.01 SJOGREN'S SYNDROME WITH KERATOCONJUNCTIVITIS SICCA (H): ICD-10-CM

## 2019-11-13 RX ORDER — SODIUM CHLORIDE FOR INHALATION 0.9 %
5 VIAL, NEBULIZER (ML) INHALATION 2 TIMES DAILY
Qty: 500 ML | Refills: 11 | Status: SHIPPED | OUTPATIENT
Start: 2019-11-13

## 2019-11-13 ASSESSMENT — REFRACTION_WEARINGRX
OD_ADD: +2.50
OD_SPHERE: -1.00
OS_CYLINDER: +1.00
OS_SPHERE: -0.75
OD_CYLINDER: +0.25
OD_AXIS: 168
OS_ADD: +2.50
OS_AXIS: 082

## 2019-11-13 ASSESSMENT — REFRACTION_CURRENTRX
OS_BRAND: ONEFIT 2.0
OS_DIAMETER: 14.9
OD_BRAND: ONEFIT 2.0
OD_BASECURVE: 7.8
OS_BASECURVE: 7.8
OS_SPHERE: -1.50
OD_SPHERE: -2.37
OD_DIAMETER: 14.9
OD_ADDL_SPECS: OPT EXTRA CLEAR
OS_ADDL_SPECS: OPT EXTRA BLUE

## 2019-11-13 ASSESSMENT — VISUAL ACUITY
OD_CC: 20/20-
METHOD: SNELLEN - LINEAR
CORRECTION_TYPE: GLASSES
OS_CC: 20/25-

## 2019-11-13 ASSESSMENT — EXTERNAL EXAM - RIGHT EYE: OD_EXAM: NORMAL

## 2019-11-13 ASSESSMENT — EXTERNAL EXAM - LEFT EYE: OS_EXAM: NORMAL

## 2019-11-13 NOTE — PROGRESS NOTES
A/P  1.) Dry Eye 2' Sjogren's Syndrome  -Dry despite topical treatment  -Good initial response to scleral lens trial today, does notice some dryness relief  -Excellent initial vision/comfort/fit  -Successful I&R, reviewed CL care and hygiene with pt ([place] Sim[place], NaCl Rx sent to pharmacy)  -OTC readers over CL's prn    RTC 2-3 weeks f/u wearing lenses

## 2019-12-02 ENCOUNTER — OFFICE VISIT (OUTPATIENT)
Dept: OPTOMETRY | Facility: CLINIC | Age: 79
End: 2019-12-02
Payer: COMMERCIAL

## 2019-12-02 DIAGNOSIS — H04.123 DRY EYES: Primary | ICD-10-CM

## 2019-12-02 DIAGNOSIS — M35.01 SJOGREN'S SYNDROME WITH KERATOCONJUNCTIVITIS SICCA (H): ICD-10-CM

## 2019-12-02 ASSESSMENT — REFRACTION_CURRENTRX
OS_SPHERE: -1.50
OS_BASECURVE: 7.8
OD_BASECURVE: 7.8
OS_DIAMETER: 14.9
OD_DIAMETER: 14.9
OS_ADDL_SPECS: OPT EXTRA BLUE
OD_BRAND: ONEFIT 2.0
OD_ADDL_SPECS: OPT EXTRA CLEAR
OS_BRAND: ONEFIT 2.0
OD_SPHERE: -2.37

## 2019-12-02 ASSESSMENT — EXTERNAL EXAM - RIGHT EYE: OD_EXAM: NORMAL

## 2019-12-02 ASSESSMENT — EXTERNAL EXAM - LEFT EYE: OS_EXAM: NORMAL

## 2019-12-02 ASSESSMENT — VISUAL ACUITY
METHOD: SNELLEN - LINEAR
OS_CC: 20/25-1
OD_CC: 20/25+2
CORRECTION_TYPE: GLASSES

## 2019-12-02 NOTE — PROGRESS NOTES
A/P  1.) Dry Eye 2' Sjogren's Syndrome  -Dry despite topical treatment  -Good initial response to scleral lens trial today, does notice some dryness relief  -Excellent initial vision/comfort/fit  -Issues with lens insertion over past several weeks, did okay with getting lenses out  -Improved insertion with lighted plunger, she would like to take home today  -Still good initial fit/vision. Continue to monitor    Continue with current lenses. RTC prn with lens concerns, otherwise 1 year routine    Contact Lens Billing  V-Code:  - CL, other  Final Contact Lens Rx       Brand Base Curve Diameter Sphere Lens Addl. Specs    Right Onefit 2.0 7.8 14.9 -2.37 std/2 flat edge Opt Extra clear    Left Onefit 2.0 7.8 14.9 -1.50 std/2 flat edge Opt Extra blue         # of units: 1 lighted insertion plunger  Price per Unit: $50      Encounter Diagnoses   Name Primary?     Dry eyes Yes     Sjogren's syndrome with keratoconjunctivitis sicca (H)

## 2020-01-13 ENCOUNTER — OFFICE VISIT (OUTPATIENT)
Dept: OPHTHALMOLOGY | Facility: CLINIC | Age: 80
End: 2020-01-13
Attending: OPHTHALMOLOGY
Payer: COMMERCIAL

## 2020-01-13 DIAGNOSIS — H02.88B MEIBOMIAN GLAND DYSFUNCTION (MGD), BILATERAL, BOTH UPPER AND LOWER LIDS: ICD-10-CM

## 2020-01-13 DIAGNOSIS — H01.02A SQUAMOUS BLEPHARITIS OF UPPER AND LOWER EYELIDS OF BOTH EYES: ICD-10-CM

## 2020-01-13 DIAGNOSIS — H01.02B SQUAMOUS BLEPHARITIS OF UPPER AND LOWER EYELIDS OF BOTH EYES: ICD-10-CM

## 2020-01-13 DIAGNOSIS — M35.00 HISTORY OF SJOGREN'S DISEASE (H): ICD-10-CM

## 2020-01-13 DIAGNOSIS — H02.88A MEIBOMIAN GLAND DYSFUNCTION (MGD), BILATERAL, BOTH UPPER AND LOWER LIDS: ICD-10-CM

## 2020-01-13 DIAGNOSIS — H04.123 DRY EYE SYNDROME, BILATERAL: Primary | ICD-10-CM

## 2020-01-13 PROBLEM — M43.16 SPONDYLOLISTHESIS AT L4-L5 LEVEL: Status: ACTIVE | Noted: 2018-05-01

## 2020-01-13 PROBLEM — G44.209 TENSION-TYPE HEADACHE: Status: ACTIVE | Noted: 2017-08-14

## 2020-01-13 PROBLEM — D47.2 MGUS (MONOCLONAL GAMMOPATHY OF UNKNOWN SIGNIFICANCE): Status: ACTIVE | Noted: 2019-10-08

## 2020-01-13 PROBLEM — H92.09 REFERRED OTALGIA: Status: ACTIVE | Noted: 2017-08-14

## 2020-01-13 PROBLEM — M79.18 MYOFASCIAL PAIN: Status: ACTIVE | Noted: 2017-08-14

## 2020-01-13 PROCEDURE — G0463 HOSPITAL OUTPT CLINIC VISIT: HCPCS | Mod: ZF

## 2020-01-13 ASSESSMENT — REFRACTION_WEARINGRX
OD_AXIS: 168
OS_AXIS: 082
OD_SPHERE: -1.00
OD_ADD: +2.50
OS_ADD: +2.50
OS_CYLINDER: +1.00
OS_SPHERE: -0.75
OD_CYLINDER: +0.25

## 2020-01-13 ASSESSMENT — TONOMETRY
OD_IOP_MMHG: 13
OS_IOP_MMHG: 17
IOP_METHOD: ICARE

## 2020-01-13 ASSESSMENT — VISUAL ACUITY
METHOD: SNELLEN - LINEAR
CORRECTION_TYPE: GLASSES
OS_CC: 20/25
OD_CC: 20/20-2

## 2020-01-13 ASSESSMENT — EXTERNAL EXAM - LEFT EYE: OS_EXAM: NORMAL

## 2020-01-13 ASSESSMENT — CONF VISUAL FIELD
OS_NORMAL: 1
OD_NORMAL: 1
METHOD: COUNTING FINGERS

## 2020-01-13 ASSESSMENT — EXTERNAL EXAM - RIGHT EYE: OD_EXAM: NORMAL

## 2020-01-13 NOTE — PROGRESS NOTES
HPI  Maria A Ramirez is a 79 year old female here for follow-up of dry eye and ocular surface.  Doing better since scleral lens fitting with Dr. Varner.  She is using PO doxycycline and fish oil.  Stopped FML drops 2-3 weeks ago.  Reports stable floaters; not seeing currently. No flashes.    She was started on Evoxac in 04/2019 and wonders if that might've helped her symptoms.  Using AT's 2-3 times per day.    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 Lower plugs in place. S/p punctal plugs X 4 lids; uppers since removed d/t overflow. Symptoms much improved with SCL's.    Plan:  CONTINUE:  Artificial tears, preservative free, to at least 4 x daily  Warm compresses 2 x daily  Continue Fish oil caps  Continue Doxycycline 50mg daily    Hold FML for now, can re-start if needed for flares    Patient disposition:  Return in about 6 months (around 7/13/2020) for full eye exam with dilation. or sooner as needed.    Steven Astudillo, PGY2  Ophthalmology Resident    Teaching statement:  Complete documentation of historical and exam elements from today's encounter can be found in the full encounter summary report (not reduplicated in this progress note). I personally obtained the chief complaint(s) and history of present illness.  I confirmed and edited as necessary the review of systems, past medical/surgical history, family history, social history, and examination findings as documented by others; and I examined the patient myself. I personally reviewed the relevant tests, images, and reports as documented above.     I formulated and edited  as necessary the assessment and plan and discussed the findings and management plan with the patient and family.    Piper Borjas MD  Comprehensive Ophthalmology & Ocular Pathology  Department of Ophthalmology and Visual Neurosciences  [email or web address]  Pager [phone]

## 2020-01-29 ENCOUNTER — TELEPHONE (OUTPATIENT)
Dept: OPTOMETRY | Facility: CLINIC | Age: 80
End: 2020-01-29

## 2020-01-29 NOTE — TELEPHONE ENCOUNTER
Pt with scleral lenses    Reviewed the left lens should be blue    Pt able to visualize and verbally demonstrated understanding    Note to Dr Gardenia Keller RN 11:20 AM 01/29/20          [facility] Call Center    Phone Message    May a detailed message be left on voicemail: yes    Reason for Call: Symptoms or Concerns     If patient has red-flag symptoms, warm transfer to triage line    Current symptom or concern: contacts lenses     Symptoms have been present for:  today    Has patient previously been seen for this? Yes    By : Sangita Varner    Date: NA     Are there any new or worsening symptoms? Yes: patient is needing help figuring out which contact goes in which eye     Please advise       Action Taken: Message routed to:  [facility]

## 2020-03-15 ENCOUNTER — HEALTH MAINTENANCE LETTER (OUTPATIENT)
Age: 80
End: 2020-03-15

## 2020-09-28 ENCOUNTER — OFFICE VISIT (OUTPATIENT)
Dept: OPHTHALMOLOGY | Facility: CLINIC | Age: 80
End: 2020-09-28
Attending: OPHTHALMOLOGY
Payer: COMMERCIAL

## 2020-09-28 DIAGNOSIS — H01.02B SQUAMOUS BLEPHARITIS OF UPPER AND LOWER EYELIDS OF BOTH EYES: ICD-10-CM

## 2020-09-28 DIAGNOSIS — M35.00 HISTORY OF SJOGREN'S DISEASE (H): ICD-10-CM

## 2020-09-28 DIAGNOSIS — Z96.1 PSEUDOPHAKIA, BOTH EYES: ICD-10-CM

## 2020-09-28 DIAGNOSIS — H02.88A MEIBOMIAN GLAND DYSFUNCTION (MGD), BILATERAL, BOTH UPPER AND LOWER LIDS: ICD-10-CM

## 2020-09-28 DIAGNOSIS — H52.13 MYOPIC ASTIGMATISM OF BOTH EYES: ICD-10-CM

## 2020-09-28 DIAGNOSIS — H52.4 PRESBYOPIA: ICD-10-CM

## 2020-09-28 DIAGNOSIS — H52.203 MYOPIC ASTIGMATISM OF BOTH EYES: ICD-10-CM

## 2020-09-28 DIAGNOSIS — H01.02A SQUAMOUS BLEPHARITIS OF UPPER AND LOWER EYELIDS OF BOTH EYES: ICD-10-CM

## 2020-09-28 DIAGNOSIS — H02.88B MEIBOMIAN GLAND DYSFUNCTION (MGD), BILATERAL, BOTH UPPER AND LOWER LIDS: ICD-10-CM

## 2020-09-28 DIAGNOSIS — H04.123 DRY EYE SYNDROME, BILATERAL: Primary | ICD-10-CM

## 2020-09-28 PROBLEM — R53.83 FATIGUE: Status: ACTIVE | Noted: 2020-08-06

## 2020-09-28 PROBLEM — R60.0 SWELLING OF LEFT PAROTID GLAND: Status: ACTIVE | Noted: 2020-08-06

## 2020-09-28 PROCEDURE — 92015 DETERMINE REFRACTIVE STATE: CPT | Mod: ZF

## 2020-09-28 PROCEDURE — G0463 HOSPITAL OUTPT CLINIC VISIT: HCPCS | Mod: ZF

## 2020-09-28 RX ORDER — DOXYCYCLINE HYCLATE 50 MG/1
50 CAPSULE ORAL DAILY
Qty: 90 CAPSULE | Refills: 4 | Status: SHIPPED | OUTPATIENT
Start: 2020-09-28 | End: 2021-10-04

## 2020-09-28 RX ORDER — FLUOROMETHOLONE 0.1 %
1 SUSPENSION, DROPS(FINAL DOSAGE FORM)(ML) OPHTHALMIC (EYE) 3 TIMES DAILY
Qty: 10 ML | Refills: 2 | Status: SHIPPED | OUTPATIENT
Start: 2020-09-28 | End: 2022-01-03

## 2020-09-28 ASSESSMENT — REFRACTION_WEARINGRX
OS_CYLINDER: +1.00
SPECS_TYPE: BIFOCAL
OD_AXIS: 168
OD_CYLINDER: +0.75
OD_ADD: +2.75
OS_ADD: +2.75
OS_SPHERE: -0.50
OD_SPHERE: -1.25
OS_AXIS: 080

## 2020-09-28 ASSESSMENT — EXTERNAL EXAM - LEFT EYE: OS_EXAM: NORMAL

## 2020-09-28 ASSESSMENT — TONOMETRY
OS_IOP_MMHG: 21
OD_IOP_MMHG: 21
IOP_METHOD: TONOPEN

## 2020-09-28 ASSESSMENT — CONF VISUAL FIELD
OD_NORMAL: 1
OS_NORMAL: 1

## 2020-09-28 ASSESSMENT — REFRACTION_MANIFEST
OD_ADD: +2.75
OS_SPHERE: -0.50
OS_ADD: +2.75
OD_AXIS: 180
OD_CYLINDER: +0.75
OS_CYLINDER: +0.50
OD_SPHERE: -1.50
OS_AXIS: 090

## 2020-09-28 ASSESSMENT — VISUAL ACUITY
OS_CC: 20/25
OD_CC: 20/25
METHOD: SNELLEN - LINEAR

## 2020-09-28 ASSESSMENT — EXTERNAL EXAM - RIGHT EYE: OD_EXAM: NORMAL

## 2020-09-28 ASSESSMENT — CUP TO DISC RATIO
OS_RATIO: 0.3
OD_RATIO: 0.3

## 2020-09-28 NOTE — PROGRESS NOTES
HPI  Maria A Ramirez is a 79 year old female with Sjogren syndrome here for follow-up of dry eyes and sicca syndrome. Over the summer, her eyes have been bothering her more with increased bilateral eye soreness with an associated gritty sensation. The symptoms are better in the morning, but worsen throughout the day.     She uses PFATs a few times a day, warm compresses, doxycycline 50mg PO daily, FML 3x daily, and PO fish oil. These measures do seem to help.     In the past, she tried restasis for several months and it didn't help. She tried Xiidra once, and it made her vision blurry, so she discontinued it. She is interested in possibly trying the Xiidra again.     She has not used scleral lenses for the past 3 months. Lens insertion and removal was difficult and not worth the minimal benefit she felt when wearing them.    She was started on Evoxac in 04/2019.    POH: Cataract surgery both eyes, s/p YAG OU, ERM  PMH: Asthma, thyroid disease, HTN, Sjogren syndrome  FH: No FH of AMD or glc  SH: Non-smoker    Assessment & Plan      (H04.123) Dry eyes, bilateral  (primary encounter diagnosis)  (H02.89) Meibomian gland dysfunction (MGD) of both eyes  (H01.02A,  H01.02B) Squamous blepharitis of upper and lower eyelids of both eyes  (M35.00) History of Sjogren's disease (H)  Comment: Diagnosed with Sjogren's syndrome after a lip biopsy. Lower plugs in place. (S/p punctal plugs X 4 lids; uppers since removed d/t overflow). She did well with scleral lenses for awhile, but found I&R difficult, and the benefit seemed to wane.     Plan:  - Artificial tears, preservative free, to at least 4 x daily  - Warm compresses 2 x daily  - Continue Fish oil caps  - Continue Doxycycline 50mg daily  - Continue FML 3x daily for now    - Try re-starting Xiidra both eyes BID    (Z96.1) Pseudophakia, both eyes  Comment: Clear visual axis  Plan: Observe    (H52.203,  H52.13) Myopic astigmatism of both eyes  (H52.4) Presbyopia  Comment: Good  vision with refraction  Plan: Given updated glasses Rx.       Patient disposition:  Return in about 3 months (around 12/28/2020) for Surface check . or sooner as needed.    Tolu Duncan MD  Department of Ophthalmology  Pager: [phone]    Teaching statement:  Complete documentation of historical and exam elements from today's encounter can be found in the full encounter summary report (not reduplicated in this progress note). I personally obtained the chief complaint(s) and history of present illness.  I confirmed and edited as necessary the review of systems, past medical/surgical history, family history, social history, and examination findings as documented by others; and I examined the patient myself. I personally reviewed the relevant tests, images, and reports as documented above.     I formulated and edited as necessary the assessment and plan and discussed the findings and management plan with the patient and family.    Piper Borjas MD  Comprehensive Ophthalmology & Ocular Pathology  Department of Ophthalmology and Visual Neurosciences  [email or web address]  Pager [phone]

## 2021-01-10 ENCOUNTER — HEALTH MAINTENANCE LETTER (OUTPATIENT)
Age: 81
End: 2021-01-10

## 2021-05-03 ENCOUNTER — OFFICE VISIT (OUTPATIENT)
Dept: OPHTHALMOLOGY | Facility: CLINIC | Age: 81
End: 2021-05-03
Attending: OPHTHALMOLOGY
Payer: COMMERCIAL

## 2021-05-03 DIAGNOSIS — H02.88B MEIBOMIAN GLAND DYSFUNCTION (MGD), BILATERAL, BOTH UPPER AND LOWER LIDS: ICD-10-CM

## 2021-05-03 DIAGNOSIS — M35.00 HISTORY OF SJOGREN'S DISEASE (H): Primary | ICD-10-CM

## 2021-05-03 DIAGNOSIS — H01.02B SQUAMOUS BLEPHARITIS OF UPPER AND LOWER EYELIDS OF BOTH EYES: ICD-10-CM

## 2021-05-03 DIAGNOSIS — H01.02A SQUAMOUS BLEPHARITIS OF UPPER AND LOWER EYELIDS OF BOTH EYES: ICD-10-CM

## 2021-05-03 DIAGNOSIS — H02.88A MEIBOMIAN GLAND DYSFUNCTION (MGD), BILATERAL, BOTH UPPER AND LOWER LIDS: ICD-10-CM

## 2021-05-03 DIAGNOSIS — H04.123 DRY EYE SYNDROME, BILATERAL: ICD-10-CM

## 2021-05-03 PROCEDURE — G0463 HOSPITAL OUTPT CLINIC VISIT: HCPCS

## 2021-05-03 PROCEDURE — 99214 OFFICE O/P EST MOD 30 MIN: CPT | Performed by: OPHTHALMOLOGY

## 2021-05-03 ASSESSMENT — REFRACTION_WEARINGRX
OS_CYLINDER: +0.50
OS_ADD: +2.75
OD_CYLINDER: +0.75
OD_SPHERE: -1.50
OD_ADD: +2.75
OS_AXIS: 090
OS_SPHERE: -0.50
SPECS_TYPE: BIFOCAL
OD_AXIS: 180

## 2021-05-03 ASSESSMENT — VISUAL ACUITY
CORRECTION_TYPE: GLASSES
METHOD: SNELLEN - LINEAR
OD_CC: 20/20 SLOW
OD_CC+: -2
OS_CC: 20/20

## 2021-05-03 ASSESSMENT — TONOMETRY
OS_IOP_MMHG: 14
IOP_METHOD: ICARE
OD_IOP_MMHG: 15

## 2021-05-03 ASSESSMENT — CUP TO DISC RATIO
OD_RATIO: 0.3
OS_RATIO: 0.3

## 2021-05-03 ASSESSMENT — CONF VISUAL FIELD
METHOD: COUNTING FINGERS
OD_NORMAL: 1
OS_NORMAL: 1

## 2021-05-03 ASSESSMENT — EXTERNAL EXAM - LEFT EYE: OS_EXAM: NORMAL

## 2021-05-03 ASSESSMENT — EXTERNAL EXAM - RIGHT EYE: OD_EXAM: NORMAL

## 2021-05-03 NOTE — NURSING NOTE
Chief Complaint(s) and History of Present Illness(es)     Dry Eye(s) Follow-Up     In both eyes.  Associated signs and symptoms include irritation, eye pain and burning.  Negative for tearing and blurred vision.  Occurring constantly.  It is worse in the evening, in the morning and throughout the day.  Duration of years.  Since onset it is gradually worsening.  Treatments tried include artificial tears and warm compresses.  Response to treatment was mild improvement.              Comments     8 month f/u for Bilateral dry eyes and Meibomian gland dysfunction, BE. Pt states BE feel more dry and irritated. Pt notes her eyes are not doing as good. Pt stopped the Xiidra because it seemed to burn and sting more. For most part vision is the same no changes. BE have been sore x the last few months. Pt notes minor/temporary relief with the drops.     Ocular meds:  - Artificial tears, preservative free, to at least 2 x daily  - Warm compresses 2 x daily  - Continue Fish oil caps  - Continue Doxycycline 50mg daily  - Continue FML 3x daily for now Be     - Xiidra both eyes BID (pt stopped this drop)    Janee Burk, MONICA 9:31 AM May 3, 2021

## 2021-05-03 NOTE — PROGRESS NOTES
HPI  Maria A Ramirez is a 80 year old female with Sjogren syndrome here for follow-up of dry eyes and sicca syndrome. She feels her eyes have been about the same since last visit. Here has been persistent bilateral eye soreness and associated gritty sensation. The symptoms are better in the morning, but worsen throughout the day.     She uses PFATs a few times a day, warm compresses, doxycycline 50mg PO daily, FML 3x daily, and PO fish oil. These measures do seem to help. She tried Xiidra again, and used it for 3 months, but she felt it made her eyes more sore.     She has not used scleral lenses for the past year or so. Lens insertion and removal was difficult and not worth the minimal benefit she felt when wearing them.    She was started on Evoxac in 04/2019.    POH: Cataract surgery both eyes, s/p YAG OU, ERM  PMH: Asthma, thyroid disease, HTN, Sjogren syndrome  FH: No FH of AMD or glc  SH: Non-smoker    Assessment & Plan      (H04.123) Dry eyes, bilateral  (primary encounter diagnosis)  (H02.89) Meibomian gland dysfunction (MGD) of both eyes  (H01.02A,  H01.02B) Squamous blepharitis of upper and lower eyelids of both eyes  (M35.00) History of Sjogren's disease (H)  Comment: Diagnosed with Sjogren's syndrome after a lip biopsy. Lower plugs in place. (S/p punctal plugs X 4 lids; uppers since removed d/t overflow). She did well with scleral lenses for awhile, but found I&R difficult, and the benefit seemed to wane. Hold off on Xiidra, no effect after 3 months and seemed to make things worse.     Plan:  - Artificial tears, preservative free, to at least 4 x daily  - Warm compresses 2 x daily  - Continue Fish oil caps  - Continue Doxycycline 50mg daily  - Change from FML to pred Healon (Rx sent to [facility])    Can consider ASEDs in the future if persistent symptoms.     (Z96.1) Pseudophakia, both eyes  Comment: Clear visual axis  Plan: Observe    (H52.203,  H52.13) Myopic astigmatism of both eyes  (H52.4)  Presbyopia  Comment: Good vision with refraction  Plan: Ok to continue with current glasses      Patient disposition:  Return for ocular surface check in 4-6 months. or sooner as needed.    Teaching statement:  Complete documentation of historical and exam elements from today's encounter can be found in the full encounter summary report (not reduplicated in this progress note). I personally obtained the chief complaint(s) and history of present illness.  I confirmed and edited as necessary the review of systems, past medical/surgical history, family history, social history, and examination findings as documented by others; and I examined the patient myself. I personally reviewed the relevant tests, images, and reports as documented above.     I formulated and edited as necessary the assessment and plan and discussed the findings and management plan with the patient and family.    Piper Borjas MD  Comprehensive Ophthalmology & Ocular Pathology  Department of Ophthalmology and Visual Neurosciences  [email or web address]  Pager [phone]

## 2021-05-08 ENCOUNTER — HEALTH MAINTENANCE LETTER (OUTPATIENT)
Age: 81
End: 2021-05-08

## 2021-05-13 ENCOUNTER — TELEPHONE (OUTPATIENT)
Dept: OPHTHALMOLOGY | Facility: CLINIC | Age: 81
End: 2021-05-13

## 2021-05-13 NOTE — TELEPHONE ENCOUNTER
Maria A Ramirez S [phone]      Pt changed from FML to Pred Healon last visit with recommendation to f/u in 4-6 months    Reviewed ok to f/u in 4-6 months per Dr. Borjas note    Reviewed would forward to Dr. Borjas and contact pt if Dr. Borjas would like patient to been sooner for eye pressure check    Pt seemed comfortable with information    Hardy Keller RN 10:07 AM 05/13/21            [facility]    Phone Message    May a detailed message be left on voicemail: yes     Reason for Call: Other: Pt had Appt with Dr. Borjas on 5/3 and was prescribed prednisolone 0.25% and hyaluronate in balanced salt SUSP compounded ophthalmic suspension. She got the medication filled and the pharmacist told her she needed to have a 1 month F/U with Dr. Borjas to have the pressure in her eyes checked. Dr. Borjas's note states 4-6 months. Pt would like some clarification on when she should schedule her F/U Appt. Please call Pt to discuss. Thank you.     Action Taken: Message routed to:  [facility] EYE    Travel Screening: Not Applicable

## 2021-10-03 DIAGNOSIS — H02.88A MEIBOMIAN GLAND DYSFUNCTION (MGD), BILATERAL, BOTH UPPER AND LOWER LIDS: ICD-10-CM

## 2021-10-03 DIAGNOSIS — H02.88B MEIBOMIAN GLAND DYSFUNCTION (MGD), BILATERAL, BOTH UPPER AND LOWER LIDS: ICD-10-CM

## 2021-10-04 RX ORDER — DOXYCYCLINE HYCLATE 50 MG/1
50 CAPSULE ORAL DAILY
Qty: 90 CAPSULE | Refills: 0 | Status: SHIPPED | OUTPATIENT
Start: 2021-10-04 | End: 2022-01-03

## 2021-10-04 NOTE — TELEPHONE ENCOUNTER
LCV: 5/3/2021  [facility] ( RTC 4-6 M)  - Continue Doxycycline 50mg daily

## 2021-10-23 ENCOUNTER — HEALTH MAINTENANCE LETTER (OUTPATIENT)
Age: 81
End: 2021-10-23

## 2022-01-03 ENCOUNTER — OFFICE VISIT (OUTPATIENT)
Dept: OPHTHALMOLOGY | Facility: CLINIC | Age: 82
End: 2022-01-03
Attending: OPHTHALMOLOGY
Payer: COMMERCIAL

## 2022-01-03 DIAGNOSIS — H04.123 DRY EYE SYNDROME, BILATERAL: Primary | ICD-10-CM

## 2022-01-03 DIAGNOSIS — H02.88A MEIBOMIAN GLAND DYSFUNCTION (MGD), BILATERAL, BOTH UPPER AND LOWER LIDS: ICD-10-CM

## 2022-01-03 DIAGNOSIS — M35.00 HISTORY OF SJOGREN'S DISEASE (H): ICD-10-CM

## 2022-01-03 DIAGNOSIS — H01.02B SQUAMOUS BLEPHARITIS OF UPPER AND LOWER EYELIDS OF BOTH EYES: ICD-10-CM

## 2022-01-03 DIAGNOSIS — H02.88B MEIBOMIAN GLAND DYSFUNCTION (MGD), BILATERAL, BOTH UPPER AND LOWER LIDS: ICD-10-CM

## 2022-01-03 DIAGNOSIS — H01.02A SQUAMOUS BLEPHARITIS OF UPPER AND LOWER EYELIDS OF BOTH EYES: ICD-10-CM

## 2022-01-03 PROCEDURE — G0463 HOSPITAL OUTPT CLINIC VISIT: HCPCS

## 2022-01-03 PROCEDURE — 99214 OFFICE O/P EST MOD 30 MIN: CPT | Performed by: OPHTHALMOLOGY

## 2022-01-03 RX ORDER — DOXYCYCLINE HYCLATE 50 MG/1
50 CAPSULE ORAL DAILY
Qty: 90 CAPSULE | Refills: 4 | Status: SHIPPED | OUTPATIENT
Start: 2022-01-03

## 2022-01-03 ASSESSMENT — EXTERNAL EXAM - LEFT EYE: OS_EXAM: NORMAL

## 2022-01-03 ASSESSMENT — TONOMETRY
IOP_METHOD: ICARE
OS_IOP_MMHG: 16
OD_IOP_MMHG: 17

## 2022-01-03 ASSESSMENT — CONF VISUAL FIELD
OD_NORMAL: 1
OS_NORMAL: 1
METHOD: COUNTING FINGERS

## 2022-01-03 ASSESSMENT — VISUAL ACUITY
OS_SC+: -1
OS_SC: 20/20
OD_SC: 20/40
OD_PH_SC: 20/25
METHOD: SNELLEN - LINEAR

## 2022-01-03 ASSESSMENT — EXTERNAL EXAM - RIGHT EYE: OD_EXAM: NORMAL

## 2022-01-03 NOTE — NURSING NOTE
"Chief Complaints and History of Present Illnesses   Patient presents with     Dry Eye(s) Follow-Up     Chief Complaint(s) and History of Present Illness(es)     Dry Eye(s) Follow-Up     Laterality: both eyes    Associated signs and symptoms: burning (OU by the end of the day), irritation and eye pain.  Negative for tearing and blurred vision    Frequency: intermittently    Duration: months    Course: stable    Treatments tried: artificial tears and warm compresses    Response to treatment: no improvement              Comments     Follow up for Bilateral Dry Eyes, MGD each eye, Squamous blepharitis BUL/LL, and Sjogren's syndrome.   \"My eyes feel about the same in the last several months.\" Patient notes a scratchy feeling each eye, about 50% of the time. Patient states vision is stable each eye in the last several months.     Ocular meds:   - PFAT QID, \"I use them about twice a day.\"   - Warm compresses BID, \"I use them about 3-4 times a week.\"   - Pred Healon, \"I use them about 3 times a day.\"   - Doxy 50mg daily   - Fish oil     \"I need a refill on Doxycycline.\"     INOCENCIO Morgan 8:30 AM 01/03/2022                  "

## 2022-06-04 ENCOUNTER — HEALTH MAINTENANCE LETTER (OUTPATIENT)
Age: 82
End: 2022-06-04

## 2022-07-12 ENCOUNTER — OFFICE VISIT (OUTPATIENT)
Dept: OPHTHALMOLOGY | Facility: CLINIC | Age: 82
End: 2022-07-12
Attending: OPHTHALMOLOGY
Payer: COMMERCIAL

## 2022-07-12 DIAGNOSIS — H04.123 DRY EYE SYNDROME, BILATERAL: Primary | ICD-10-CM

## 2022-07-12 PROCEDURE — 68761 CLOSE TEAR DUCT OPENING: CPT | Performed by: OPHTHALMOLOGY

## 2022-07-12 PROCEDURE — G0463 HOSPITAL OUTPT CLINIC VISIT: HCPCS | Mod: 25

## 2022-07-12 RX ORDER — LOSARTAN POTASSIUM 100 MG/1
100 TABLET ORAL
COMMUNITY
Start: 2021-10-15

## 2022-07-12 RX ORDER — TRAZODONE HYDROCHLORIDE 50 MG/1
25 TABLET, FILM COATED ORAL PRN
COMMUNITY
Start: 2021-10-15

## 2022-07-12 RX ORDER — PILOCARPINE HYDROCHLORIDE 5 MG/1
TABLET, FILM COATED ORAL
COMMUNITY
Start: 2022-06-01

## 2022-07-12 RX ORDER — AMLODIPINE BESYLATE 5 MG/1
5 TABLET ORAL
COMMUNITY
Start: 2021-10-15 | End: 2022-08-12

## 2022-07-12 RX ORDER — HYDROXYCHLOROQUINE SULFATE 200 MG/1
TABLET, FILM COATED ORAL
COMMUNITY
Start: 2021-08-19

## 2022-07-12 ASSESSMENT — EXTERNAL EXAM - LEFT EYE: OS_EXAM: NORMAL

## 2022-07-12 ASSESSMENT — VISUAL ACUITY
OD_PH_SC: 20/30
OD_SC: 20/50
OD_SC+: -2
METHOD: SNELLEN - LINEAR
OS_SC: 20/20
OD_PH_SC+: -1

## 2022-07-12 ASSESSMENT — TONOMETRY
OS_IOP_MMHG: 20
IOP_METHOD: TONOPEN
OD_IOP_MMHG: 19

## 2022-07-12 ASSESSMENT — CONF VISUAL FIELD
OD_NORMAL: 1
OS_NORMAL: 1
METHOD: COUNTING FINGERS

## 2022-07-12 ASSESSMENT — CUP TO DISC RATIO
OD_RATIO: 0.3
OS_RATIO: 0.3

## 2022-07-12 ASSESSMENT — EXTERNAL EXAM - RIGHT EYE: OD_EXAM: NORMAL

## 2022-07-12 NOTE — PROGRESS NOTES
Chief Complaint(s) and History of Present Illness(es)     Dry Eye Syndrome Follow Up               Comments     Patient states that her vision is mostly clear. She states that if she is   reading a book for a little while then it will get blurry. Patient states   that both of her eyes gets itchy,sore and they hurt sometimes. She states   that her eyes will get red once in a while. Will use warm compresses if   her eyes are really bad. She feels her left tear duct plug came out.     Ocular Meds:PRAT 2-3 times a day in BE   Pred Healon 3x daily  Oral Meds:   Fish oil caps  Doxycycline 50mg daily    Chip Levi COT, July 12, 2022 2:35 PM                  Review of systems for the eyes was negative other than the pertinent positives/negatives listed in the HPI.      Assessment & Plan      Maria A Ramirez is a 81 year old female with the following diagnoses:   1. Dry eye syndrome, bilateral         Patient of Dr. Borjas here for dilated fundus exam   Continues to struggle with dry eye symptoms  Using Pred Healon three times a day and artificial tears 2-3x daily   Warm compresses occasionally   Feels the left lower lid plug fell out  ASEDS too expensive  Intolerant to xiidra.  Restasis did not work  Scleral lens intolerance     Discussed contributing factors  Recommend lid hygiene and warm compresses regularly  Recommend artifical tears four times a day and as needed   Continue pred healon (check intraocular pressure in 4-6 mo)  Replaced left lower lid plug today   Gregvaya samples given, will call to discuss prescription if helping    Patient disposition:   Return in about 1 year (around 7/12/2023) for DFE.           Attending Physician Attestation:  Complete documentation of historical and exam elements from today's encounter can be found in the full encounter summary report (not reduplicated in this progress note).  I personally obtained the chief complaint(s) and history of present illness.  I confirmed and edited as  necessary the review of systems, past medical/surgical history, family history, social history, and examination findings as documented by others; and I examined the patient myself.  I personally reviewed the relevant tests, images, and reports as documented above.  I formulated and edited as necessary the assessment and plan and discussed the findings and management plan with the patient and family. . - Mike Tinoco MD

## 2022-07-12 NOTE — NURSING NOTE
Chief Complaints and History of Present Illnesses   Patient presents with     Dry Eye Syndrome Follow Up     Chief Complaint(s) and History of Present Illness(es)     Dry Eye Syndrome Follow Up               Comments     Patient states that her vision is mostly clear. She states that if she is reading a book for a little while then it will get blurry. Patient states that both of her eyes gets itchy,sore and they hurt sometimes. She states that her eyes will get red once in a while. Will use warm compresses if her eyes are really bad. She feels her left tear duct plug came out.     Ocular Meds:PRAT 2-3 times a day in BE   Pred Healon 3x daily  Oral Meds:   Fish oil caps  Doxycycline 50mg daily    Chip PAINTER, July 12, 2022 2:35 PM

## 2022-08-12 ENCOUNTER — OFFICE VISIT (OUTPATIENT)
Dept: OPHTHALMOLOGY | Facility: CLINIC | Age: 82
End: 2022-08-12
Attending: OPHTHALMOLOGY
Payer: COMMERCIAL

## 2022-08-12 DIAGNOSIS — H04.123 DRY EYE SYNDROME OF BOTH EYES: ICD-10-CM

## 2022-08-12 DIAGNOSIS — H35.361 DEGENERATIVE RETINAL DRUSEN OF RIGHT EYE: ICD-10-CM

## 2022-08-12 DIAGNOSIS — Z79.899 LONG-TERM USE OF PLAQUENIL: Primary | ICD-10-CM

## 2022-08-12 PROCEDURE — 92083 EXTENDED VISUAL FIELD XM: CPT | Performed by: OPHTHALMOLOGY

## 2022-08-12 PROCEDURE — 92250 FUNDUS PHOTOGRAPHY W/I&R: CPT | Performed by: OPHTHALMOLOGY

## 2022-08-12 PROCEDURE — 92015 DETERMINE REFRACTIVE STATE: CPT

## 2022-08-12 PROCEDURE — 99207 FUNDUS AUTOFLUORESCENCE IMAGE (FAF) OU (BOTH EYES): CPT | Performed by: OPHTHALMOLOGY

## 2022-08-12 PROCEDURE — 99214 OFFICE O/P EST MOD 30 MIN: CPT | Performed by: OPHTHALMOLOGY

## 2022-08-12 PROCEDURE — 92134 CPTRZ OPH DX IMG PST SGM RTA: CPT | Performed by: OPHTHALMOLOGY

## 2022-08-12 PROCEDURE — G0463 HOSPITAL OUTPT CLINIC VISIT: HCPCS | Mod: 25

## 2022-08-12 ASSESSMENT — REFRACTION_WEARINGRX
OS_SPHERE: -0.50
OD_SPHERE: -1.50
OS_ADD: +2.75
OD_ADD: +2.75
OD_CYLINDER: +0.75
OS_AXIS: 090
OS_CYLINDER: +0.50
OD_AXIS: 180

## 2022-08-12 ASSESSMENT — CONF VISUAL FIELD
OS_NORMAL: 1
METHOD: COUNTING FINGERS
OD_NORMAL: 1

## 2022-08-12 ASSESSMENT — REFRACTION_MANIFEST
OS_AXIS: 090
OS_CYLINDER: +0.25
OS_SPHERE: -0.75
OS_ADD: +2.75
OD_ADD: +2.75
OD_AXIS: 180
OD_SPHERE: -1.25
OD_CYLINDER: +0.50

## 2022-08-12 ASSESSMENT — CUP TO DISC RATIO
OD_RATIO: 0.3
OS_RATIO: 0.3

## 2022-08-12 ASSESSMENT — EXTERNAL EXAM - RIGHT EYE: OD_EXAM: NORMAL

## 2022-08-12 ASSESSMENT — EXTERNAL EXAM - LEFT EYE: OS_EXAM: NORMAL

## 2022-08-12 ASSESSMENT — TONOMETRY
OS_IOP_MMHG: 18
IOP_METHOD: TONOPEN
OD_IOP_MMHG: 20

## 2022-08-12 ASSESSMENT — VISUAL ACUITY
OD_CC: 20/30
METHOD: SNELLEN - LINEAR
OS_CC+: -1
CORRECTION_TYPE: GLASSES
OS_CC: 20/20

## 2022-08-12 NOTE — NURSING NOTE
Chief Complaints and History of Present Illnesses   Patient presents with     Consult For     Chief Complaint(s) and History of Present Illness(es)     Consult For     Laterality: both eyes    Onset: gradual    Onset: years ago    Quality: States va is the same since last visit      Associated symptoms: redness and tearing.  Negative for dryness, photophobia, flashes and floaters    Treatments tried: eye drops    Pain scale: 0/10              Comments     States that Dr Johnson will not refill her medications until she get a VF and OCT.  Predhelon  Kelsy Flower COT 1:02 PM August 12, 2022

## 2022-08-12 NOTE — PROGRESS NOTES
"Chief complaint   Dry Eye Syndrome Follow Up         HPI    Maria A Ramirez 81 year old female with a history of dry eye syndrome, blepharitis and Sjogren's syndrome who presents for evaluation. Last seen 7/12/2022 with Dr. Tinoco. Presents today for plaquenil testing. She has been on plaquenil for about 1 year, and takes a total dose of 300mg per day. Overall she feels that the fatigue she experienced before plaquenil has significantly improved. She still experiences bilateral dry eye symptoms, largely unchanged.     She feels that her vision is very good since her cataract surgery, though notes that one of her implanted lenses is \"a little too strong\". No flashes, though reports that she used to have floaters (not recently). No eye pain, though dry sensation each eye.     Past ocular history   Prior eye surgery/laser/Trauma: yes  Cataract surgery both eyes, s/p YAG OU, ERM  CTL wearer:No  Glasses : -  Family Hx of eye disease: No    PMH     Past Medical History:   Diagnosis Date     Hypertension      Sjogrens syndrome (H)        PSH     Past Surgical History:   Procedure Laterality Date     CATARACT IOL, RT/LT Bilateral 2010       Meds     Current Outpatient Medications   Medication     albuterol (ALBUTEROL) 108 (90 BASE) MCG/ACT Inhaler     amLODIPine (NORVASC) 5 MG tablet     amLODIPine (NORVASC) 5 MG tablet     cevimeline (EVOXAC) 30 MG capsule     Cholecalciferol (VITAMIN D3) 1000 units CAPS     doxycycline hyclate (VIBRAMYCIN) 50 MG capsule     hydroxychloroquine (PLAQUENIL) 200 MG tablet     losartan (COZAAR) 100 MG tablet     losartan (COZAAR) 100 MG tablet     Multiple Vitamin (MULTI-VITAMINS) TABS     Omega-3 Fatty Acids (FISH OIL PO)     pilocarpine (SALAGEN) 5 MG tablet     prednisolone 0.25% and hyaluronate in balanced salt SUSP compounded ophthalmic suspension     sodium chloride 0.9 % neb solution     traZODone (DESYREL) 50 MG tablet     No current facility-administered medications for this visit. "       Labs   -    Imaging   OCT mac: ERM right eye, Drusen right  VF normal  FAF + temporal to ODH      Drops Currently Taking   Ocular Meds:  PRAT 2-3 times a day in BE   Pred Healon 3x daily in both eyes (started 1-2 years ago)    Oral Meds:  Fish oil caps  Doxycycline 50mg daily  Plaquenil 300mg PO QD    Assessment/Plan   # Dry eye syndrome OU    - Continue warm compresses (educated to increase to 1-2x/day)    - Lid scrubs recommended    -plugs each eye LL    - Artificial tears QID each eye PRN    - Bilateral lower lid plugs still in place    # Plaquenil use    - No evidence of toxicity on OCT or VF testing today    - Started about 1 year ago, low cumulative dose and low likelihood of toxicity    - Okay to continue plaquenil at current dose    #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  I personally obtained the chief complaint(s) and history of present illness.  I confirmed and edited as necessary the review of systems, past medical/surgical history, family history, social history, and examination findings as documented by others; and I examined the patient myself.  I personally reviewed the relevant tests, images, and reports as documented above.  I formulated and edited as necessary the assessment and plan and discussed the findings and management plan with the patient and family. - Roslaina Cannon MD

## 2022-09-26 DIAGNOSIS — M35.00 HISTORY OF SJOGREN'S DISEASE (H): ICD-10-CM

## 2022-09-26 DIAGNOSIS — H01.02B SQUAMOUS BLEPHARITIS OF UPPER AND LOWER EYELIDS OF BOTH EYES: ICD-10-CM

## 2022-09-26 DIAGNOSIS — H01.02A SQUAMOUS BLEPHARITIS OF UPPER AND LOWER EYELIDS OF BOTH EYES: ICD-10-CM

## 2022-09-26 DIAGNOSIS — H04.123 DRY EYE SYNDROME, BILATERAL: ICD-10-CM

## 2022-09-26 DIAGNOSIS — H02.88A MEIBOMIAN GLAND DYSFUNCTION (MGD), BILATERAL, BOTH UPPER AND LOWER LIDS: ICD-10-CM

## 2022-09-26 DIAGNOSIS — H02.88B MEIBOMIAN GLAND DYSFUNCTION (MGD), BILATERAL, BOTH UPPER AND LOWER LIDS: ICD-10-CM

## 2022-09-26 NOTE — TELEPHONE ENCOUNTER
prednisolone 0.25% and hyaluronate in balanced salt SUSP compounded ophthalmic suspension  Last Written Prescription Date:   8/31/2021  Last Fill Quantity: 15,   # refills: 11  Last Office Visit :  8/12/2022  Future Office visit:  None     Rosalina Cannon MD  Ophthalmology    Assessment/Plan   # Dry eye syndrome OU    - Continue warm compresses (educated to increase to 1-2x/day)    - Lid scrubs recommended    -plugs each eye LL    - Artificial tears QID each eye PRN    - Bilateral lower lid plugs still in place     # Plaquenil use    - No evidence of toxicity on OCT or VF testing today    - Started about 1 year ago, low cumulative dose and low likelihood of toxicity    - Okay to continue plaquenil at current dose     #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

## 2022-10-09 ENCOUNTER — HEALTH MAINTENANCE LETTER (OUTPATIENT)
Age: 82
End: 2022-10-09

## 2022-11-26 ENCOUNTER — HEALTH MAINTENANCE LETTER (OUTPATIENT)
Age: 82
End: 2022-11-26

## 2023-05-02 ENCOUNTER — TELEPHONE (OUTPATIENT)
Dept: OPHTHALMOLOGY | Facility: CLINIC | Age: 83
End: 2023-05-02
Payer: COMMERCIAL

## 2023-05-02 NOTE — TELEPHONE ENCOUNTER
Left lower plug likely fell out more recently and pt would like to schedule f/u appt with Dr. Cannon to review replacing-- worsening dry eye symptoms    Scheduled May 15th with Dr. Cannon    Encouraged increase use of PF artificial tears and contact clinic for any acute worsening of symptoms/vision changes    Hardy Keller RN 10:09 AM 05/02/23          [facility] Center    Phone Message    May a detailed message be left on voicemail: yes     Reason for Call: Symptoms or Concerns     If patient has red-flag symptoms, warm transfer to triage line    Current symptom or concern: Pt states that she believes the tear duct plug in the Lt eye is coming out. She is having dry eye and soreness.    Symptoms have been present for:  Unknown day(s)    Has patient previously been seen for this? Yes    By : Dr. Tinoco    Date: 7/12/2022    Are there any new or worsening symptoms? No      Action Taken: Message routed to:  [facility] EYE    Travel Screening: Not Applicable

## 2023-05-15 ENCOUNTER — OFFICE VISIT (OUTPATIENT)
Dept: OPHTHALMOLOGY | Facility: CLINIC | Age: 83
End: 2023-05-15
Attending: OPHTHALMOLOGY
Payer: COMMERCIAL

## 2023-05-15 ENCOUNTER — TELEPHONE (OUTPATIENT)
Dept: OPHTHALMOLOGY | Facility: CLINIC | Age: 83
End: 2023-05-15
Payer: COMMERCIAL

## 2023-05-15 DIAGNOSIS — H35.369 DRUSEN: ICD-10-CM

## 2023-05-15 DIAGNOSIS — H04.123 DRY EYE SYNDROME, BILATERAL: Primary | ICD-10-CM

## 2023-05-15 DIAGNOSIS — Z79.899 LONG-TERM USE OF PLAQUENIL: ICD-10-CM

## 2023-05-15 PROCEDURE — 99214 OFFICE O/P EST MOD 30 MIN: CPT | Mod: 25 | Performed by: OPHTHALMOLOGY

## 2023-05-15 PROCEDURE — 68761 CLOSE TEAR DUCT OPENING: CPT | Performed by: OPHTHALMOLOGY

## 2023-05-15 PROCEDURE — G0463 HOSPITAL OUTPT CLINIC VISIT: HCPCS | Mod: 25 | Performed by: OPHTHALMOLOGY

## 2023-05-15 RX ORDER — DOXYCYCLINE HYCLATE 50 MG/1
50 CAPSULE ORAL DAILY
Qty: 60 CAPSULE | Refills: 4 | Status: SHIPPED | OUTPATIENT
Start: 2023-05-15

## 2023-05-15 RX ORDER — DOXYCYCLINE HYCLATE 50 MG/1
50 CAPSULE ORAL DAILY
Qty: 60 CAPSULE | Refills: 4 | Status: SHIPPED | OUTPATIENT
Start: 2023-05-15 | End: 2023-05-15

## 2023-05-15 ASSESSMENT — VISUAL ACUITY
METHOD: SNELLEN - LINEAR
OD_CC+: -3
OD_CC: 20/20
CORRECTION_TYPE: GLASSES
OS_CC: 20/20
OS_CC+: -1

## 2023-05-15 ASSESSMENT — EXTERNAL EXAM - RIGHT EYE: OD_EXAM: NORMAL

## 2023-05-15 ASSESSMENT — REFRACTION_WEARINGRX
OS_SPHERE: -0.50
OD_SPHERE: -1.50
OD_CYLINDER: +0.75
OD_AXIS: 180
OD_ADD: +2.75
OS_CYLINDER: +0.50
OS_AXIS: 090
OS_ADD: +2.75

## 2023-05-15 ASSESSMENT — CONF VISUAL FIELD
OS_INFERIOR_TEMPORAL_RESTRICTION: 0
OS_INFERIOR_NASAL_RESTRICTION: 0
OD_NORMAL: 1
OS_NORMAL: 1
OD_INFERIOR_TEMPORAL_RESTRICTION: 0
OD_SUPERIOR_TEMPORAL_RESTRICTION: 0
OS_SUPERIOR_TEMPORAL_RESTRICTION: 0
OD_SUPERIOR_NASAL_RESTRICTION: 0
OS_SUPERIOR_NASAL_RESTRICTION: 0
OD_INFERIOR_NASAL_RESTRICTION: 0
METHOD: COUNTING FINGERS

## 2023-05-15 ASSESSMENT — EXTERNAL EXAM - LEFT EYE: OS_EXAM: NORMAL

## 2023-05-15 ASSESSMENT — TONOMETRY
OD_IOP_MMHG: 18
OS_IOP_MMHG: 20
IOP_METHOD: ICARE

## 2023-05-15 NOTE — PROGRESS NOTES
"Chief complaint   Dry Eye Syndrome Follow Up         HPI    Maria A Ramirez 82 year old female with a history of dry eye syndrome, blepharitis and Sjogren's syndrome who presents for evaluation. Last seen 7/12/2022 with Dr. Tinoco. Presents today for plaquenil testing. She has been on plaquenil for about 1 year, and takes a total dose of 300mg per day. Overall she feels that the fatigue she experienced before plaquenil has significantly improved. She still experiences bilateral dry eye symptoms, largely unchanged.     She feels that her vision is very good since her cataract surgery, though notes that one of her implanted lenses is \"a little too strong\". No flashes, though reports that she used to have floaters (not recently). No eye pain, though dry sensation each eye.     Past ocular history   Prior eye surgery/laser/Trauma: yes  Cataract surgery both eyes, s/p YAG OU, ERM  CTL wearer:No  Glasses : -  Family Hx of eye disease: No    PMH     Past Medical History:   Diagnosis Date     Hypertension      Sjogrens syndrome (H)        PSH     Past Surgical History:   Procedure Laterality Date     CATARACT IOL, RT/LT Bilateral 2010       Meds     Current Outpatient Medications   Medication     albuterol (ALBUTEROL) 108 (90 BASE) MCG/ACT Inhaler     amLODIPine (NORVASC) 5 MG tablet     Cholecalciferol (VITAMIN D3) 1000 units CAPS     doxycycline hyclate (VIBRAMYCIN) 50 MG capsule     hydroxychloroquine (PLAQUENIL) 200 MG tablet     losartan (COZAAR) 100 MG tablet     Multiple Vitamin (MULTI-VITAMINS) TABS     Omega-3 Fatty Acids (FISH OIL PO)     pilocarpine (SALAGEN) 5 MG tablet     prednisolone 0.25% and hyaluronate in balanced salt SUSP compounded ophthalmic suspension     sodium chloride 0.9 % neb solution     traZODone (DESYREL) 50 MG tablet     No current facility-administered medications for this visit.       Labs   -    Imaging   OCT mac: ERM right eye, Drusen right  VF normal  FAF + temporal to ODH      Drops " Currently Taking   Ocular Meds:  PRAT 2-3 times a day in BE   Pred Healon 3x daily in both eyes (started 1-2 years ago)  Used restasis and xiidra in the past but without improvement  Tried nose spray without improvement    Oral Meds:  Fish oil caps  Doxycycline 50mg daily  Plaquenil 300mg PO QD    Assessment/Plan   # Dry eye syndrome OU  Patient is still symptomatic now    - Plugs are out    # Plaquenil use    - No evidence of toxicity on OCT or VF testing previous visit    - Started about 1 year ago, low cumulative dose and low likelihood of toxicity    - Okay to continue plaquenil at current dose    #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, FAF    Plan:  Plugs replaced today each eye upper and lower  Discussed punctal cautery in the future (patient will aski insurance  Discussed serum tears, will delay them for now  Continue artificial tear   Continue warm compresses (educated to increase to 1-2x/day)   Lid scrubs recommended  Continue doxycycli 50mg daily    Follow up:  Oph: 1 year with retina with VF 10-2 FAF OCT     Chester Mederos MD  Fellow, Cornea, External Disease and Refractive Surgery  Department of Ophthalmology  [facility]    Attending Physician Attestation:  Complete documentation of historical and exam elements from today's encounter can be found in the full encounter summary report (not reduplicated in this progress note).  I personally obtained the chief complaint(s) and history of present illness.  I confirmed and edited as necessary the review of systems, past medical/surgical history, family history, social history, and examination findings as documented by others; and I examined the patient myself.  I personally reviewed the relevant tests, images, and reports as documented above.  I formulated and edited as necessary the assessment and plan and discussed the findings and management plan with the patient and family. - Rosalina Cannon MD

## 2023-05-15 NOTE — TELEPHONE ENCOUNTER
[facility] Call Center    Phone Message    May a detailed message be left on voicemail: yes     Reason for Call: Other: Patient states Yes she can come early, but what time.  Please call asap. Thank you.     Action Taken: Message routed to:  [facility] Ophthalmology    Travel Screening: Not Applicable

## 2023-05-15 NOTE — NURSING NOTE
Chief Complaints and History of Present Illnesses   Patient presents with     Follow Up     Chief Complaint(s) and History of Present Illness(es)     Follow Up            Laterality: both eyes    Quality: fluctuating    Course: stable    Associated symptoms: dryness and eye pain.  Negative for flashes and floaters    Treatments tried: eye drops          Comments    Here for follow up. Vision is about the same - fluctuates. Dryness and pain is about the same. Uses drops as instructed. She feels one of the punctal plug fell out - left eye. No flashes or floaters.    John Cheney COT 12:38 PM May 15, 2023

## 2023-07-05 ENCOUNTER — OFFICE VISIT (OUTPATIENT)
Dept: OPHTHALMOLOGY | Facility: CLINIC | Age: 83
End: 2023-07-05
Attending: OPHTHALMOLOGY
Payer: COMMERCIAL

## 2023-07-05 ENCOUNTER — TELEPHONE (OUTPATIENT)
Dept: OPHTHALMOLOGY | Facility: CLINIC | Age: 83
End: 2023-07-05

## 2023-07-05 DIAGNOSIS — H35.369 DRUSEN: Primary | ICD-10-CM

## 2023-07-05 DIAGNOSIS — Z79.899 LONG-TERM USE OF PLAQUENIL: ICD-10-CM

## 2023-07-05 DIAGNOSIS — H04.123 DRY EYE SYNDROME, BILATERAL: ICD-10-CM

## 2023-07-05 PROCEDURE — 99213 OFFICE O/P EST LOW 20 MIN: CPT | Performed by: OPHTHALMOLOGY

## 2023-07-05 PROCEDURE — G0463 HOSPITAL OUTPT CLINIC VISIT: HCPCS | Performed by: OPHTHALMOLOGY

## 2023-07-05 ASSESSMENT — CONF VISUAL FIELD
METHOD: COUNTING FINGERS
OD_SUPERIOR_NASAL_RESTRICTION: 0
OD_INFERIOR_TEMPORAL_RESTRICTION: 0
OS_SUPERIOR_TEMPORAL_RESTRICTION: 0
OS_SUPERIOR_NASAL_RESTRICTION: 0
OD_INFERIOR_NASAL_RESTRICTION: 0
OS_INFERIOR_NASAL_RESTRICTION: 0
OS_INFERIOR_TEMPORAL_RESTRICTION: 0
OD_NORMAL: 1
OD_SUPERIOR_TEMPORAL_RESTRICTION: 0
OS_NORMAL: 1

## 2023-07-05 ASSESSMENT — EXTERNAL EXAM - LEFT EYE: OS_EXAM: NORMAL

## 2023-07-05 ASSESSMENT — REFRACTION_WEARINGRX
OD_ADD: +2.75
OS_AXIS: 090
OS_CYLINDER: +0.50
OD_AXIS: 180
OS_ADD: +2.75
OD_CYLINDER: +0.75
OS_SPHERE: -0.50
OD_SPHERE: -1.50

## 2023-07-05 ASSESSMENT — TONOMETRY
OD_IOP_MMHG: 17
IOP_METHOD: TONOPEN
OS_IOP_MMHG: 24

## 2023-07-05 ASSESSMENT — VISUAL ACUITY
OS_CC+: -1
METHOD: SNELLEN - LINEAR
OS_CC: 20/20
OD_CC: 20/25

## 2023-07-05 ASSESSMENT — EXTERNAL EXAM - RIGHT EYE: OD_EXAM: NORMAL

## 2023-07-05 NOTE — PROGRESS NOTES
"Chief complaint   Dry Eye Syndrome Follow Up      HPI    Maria A Ramirez 82 year old female with a history of dry eye syndrome, blepharitis and Sjogren's syndrome who presents for evaluation. Last seen 7/12/2022 with Dr. Tinoco. Presents today for plaquenil testing. She has been on plaquenil for about 1 year, and takes a total dose of 300mg per day. Overall she feels that the fatigue she experienced before plaquenil has significantly improved. She still experiences bilateral dry eye symptoms, largely unchanged.     She feels that her vision is very good since her cataract surgery, though notes that one of her implanted lenses is \"a little too strong\". No flashes, though reports that she used to have floaters (not recently). No eye pain, though dry sensation each eye.     Past ocular history   Prior eye surgery/laser/Trauma: yes  Cataract surgery both eyes, s/p YAG OU, ERM  CTL wearer:No  Glasses : -  Family Hx of eye disease: No    PMH     Past Medical History:   Diagnosis Date     Hypertension      Sjogrens syndrome (H)        PSH     Past Surgical History:   Procedure Laterality Date     CATARACT IOL, RT/LT Bilateral 2010       Meds     Current Outpatient Medications   Medication     albuterol (ALBUTEROL) 108 (90 BASE) MCG/ACT Inhaler     amLODIPine (NORVASC) 5 MG tablet     Cholecalciferol (VITAMIN D3) 1000 units CAPS     doxycycline hyclate (VIBRAMYCIN) 50 MG capsule     doxycycline hyclate (VIBRAMYCIN) 50 MG capsule     hydroxychloroquine (PLAQUENIL) 200 MG tablet     losartan (COZAAR) 100 MG tablet     Multiple Vitamin (MULTI-VITAMINS) TABS     Omega-3 Fatty Acids (FISH OIL PO)     pilocarpine (SALAGEN) 5 MG tablet     Polyethyl Glycol-Propyl Glycol (SYSTANE ULTRA PF OP)     prednisolone 0.25% and hyaluronate in balanced salt SUSP compounded ophthalmic suspension     sodium chloride 0.9 % neb solution     traZODone (DESYREL) 50 MG tablet     No current facility-administered medications for this visit. "       Labs   -  Imaging   OCT mac: ERM right eye, Drusen right  VF normal  FAF + temporal to ODH    Drops Currently Taking   Ocular Meds:  PRAT 2-3 times a day in BE   Pred Healon 3x daily in both eyes (started 1-2 years ago)  Used restasis and xiidra in the past but without improvement  Tried nose spray without improvement    Oral Meds:  Fish oil caps  Doxycycline 50mg daily  Plaquenil 300mg PO QD    Assessment/Plan   # Dry eye syndrome each eye  #sjogrens Disease  Patient is still symptomatic now    - Plug in the upper lid right eye noted. (collagen), no plugs noted in the other punctum today    -tried  Contact lens before but had trouble using them.    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  I personally obtained the chief complaint(s) and history of present illness.  I confirmed and edited as necessary the review of systems, past medical/surgical history, family history, social history, and examination findings as documented by others; and I examined the patient myself.  I personally reviewed the relevant tests, images, and reports as documented above.  I formulated and edited as necessary the  assessment and plan and discussed the findings and management plan with the patient and family. - Rosalina Cannon MD

## 2023-07-05 NOTE — NURSING NOTE
Chief Complaints and History of Present Illnesses   Patient presents with     Follow Up     Chief Complaint(s) and History of Present Illness(es)     Follow Up            Laterality: both eyes    Associated symptoms: Negative for flashes and floaters    Treatments tried: artificial tears    Pain scale: 0/10          Comments    Follow up for dry eye check and punctal plug check.  The patient states she thinks the plugs came out.  The patient is using PF AT's one to three times daily and Pred Healon twice daily.  She is taking oral doxycycline.  Aliyah Hernandez, COA, COA 9:37 AM 07/05/2023

## 2023-07-05 NOTE — TELEPHONE ENCOUNTER
Patient left a vm on my office phone on 7/3/2023 and wants to set up a time to meet.  Left VM today to call me back with some times she is available.    Will also send a [employer] message.    [facility] Manager

## 2023-07-05 NOTE — ADDENDUM NOTE
Addended by: KRYSTAL RODRIGUEZ on: 7/5/2023 02:31 PM     Modules accepted: Level of Service    
Addended by: KRYSTAL RODRIGUEZ on: 7/5/2023 02:40 PM     Modules accepted: Level of Service    
none

## 2023-08-15 ENCOUNTER — TELEPHONE (OUTPATIENT)
Dept: OPHTHALMOLOGY | Facility: CLINIC | Age: 83
End: 2023-08-15

## 2023-08-15 ENCOUNTER — MYC MEDICAL ADVICE (OUTPATIENT)
Dept: OPHTHALMOLOGY | Facility: CLINIC | Age: 83
End: 2023-08-15
Payer: COMMERCIAL

## 2023-08-15 NOTE — TELEPHONE ENCOUNTER
[facility] Call Center    Phone Message    May a detailed message be left on voicemail: yes     Reason for Call: Other: Pt is wanting to schedule an in person meeting with Maribel. Writer tried calling but was unable to reach Maribel. Please reach out to pt for scheduling a meeting. Thank You!     Action Taken: Message routed to:  [facility] eye    Travel Screening: Not Applicable

## 2023-09-18 ENCOUNTER — TELEPHONE (OUTPATIENT)
Dept: OPHTHALMOLOGY | Facility: CLINIC | Age: 83
End: 2023-09-18
Payer: COMMERCIAL

## 2024-01-06 ENCOUNTER — HEALTH MAINTENANCE LETTER (OUTPATIENT)
Age: 84
End: 2024-01-06

## 2025-01-25 ENCOUNTER — HEALTH MAINTENANCE LETTER (OUTPATIENT)
Age: 85
End: 2025-01-25